# Patient Record
Sex: MALE | Race: WHITE | NOT HISPANIC OR LATINO | Employment: FULL TIME | URBAN - METROPOLITAN AREA
[De-identification: names, ages, dates, MRNs, and addresses within clinical notes are randomized per-mention and may not be internally consistent; named-entity substitution may affect disease eponyms.]

---

## 2022-08-31 ENCOUNTER — OFFICE VISIT (OUTPATIENT)
Dept: FAMILY MEDICINE CLINIC | Facility: CLINIC | Age: 32
End: 2022-08-31
Payer: COMMERCIAL

## 2022-08-31 VITALS
TEMPERATURE: 98.4 F | HEIGHT: 68 IN | SYSTOLIC BLOOD PRESSURE: 120 MMHG | BODY MASS INDEX: 30.92 KG/M2 | HEART RATE: 88 BPM | RESPIRATION RATE: 16 BRPM | DIASTOLIC BLOOD PRESSURE: 80 MMHG | WEIGHT: 204 LBS

## 2022-08-31 DIAGNOSIS — Z13.6 SCREENING FOR CARDIOVASCULAR, RESPIRATORY, AND GENITOURINARY DISEASES: ICD-10-CM

## 2022-08-31 DIAGNOSIS — Z00.00 WELL ADULT EXAM: Primary | ICD-10-CM

## 2022-08-31 DIAGNOSIS — Z13.29 SCREENING FOR THYROID DISORDER: ICD-10-CM

## 2022-08-31 DIAGNOSIS — Z13.83 SCREENING FOR CARDIOVASCULAR, RESPIRATORY, AND GENITOURINARY DISEASES: ICD-10-CM

## 2022-08-31 DIAGNOSIS — Z11.59 NEED FOR HEPATITIS C SCREENING TEST: ICD-10-CM

## 2022-08-31 DIAGNOSIS — Z13.89 SCREENING FOR CARDIOVASCULAR, RESPIRATORY, AND GENITOURINARY DISEASES: ICD-10-CM

## 2022-08-31 DIAGNOSIS — M25.50 POLYARTHRALGIA: ICD-10-CM

## 2022-08-31 DIAGNOSIS — Z11.4 SCREENING FOR HIV (HUMAN IMMUNODEFICIENCY VIRUS): ICD-10-CM

## 2022-08-31 PROCEDURE — 3725F SCREEN DEPRESSION PERFORMED: CPT | Performed by: FAMILY MEDICINE

## 2022-08-31 PROCEDURE — 99385 PREV VISIT NEW AGE 18-39: CPT | Performed by: FAMILY MEDICINE

## 2022-08-31 NOTE — PROGRESS NOTES
[facility] HEALTH MAINTENANCE OFFICE VISIT  [facility]    NAME: Rody Garay  AGE: 28 y o  SEX: male  : 1990     DATE: 2022    Assessment and Plan     1  Well adult exam    2  Need for hepatitis C screening test  -     Hepatitis C Antibody ([facility]); Future    3  Screening for HIV (human immunodeficiency virus)  -     [facility]- Human Immunodeficiency Virus 1/2 Antigen / Antibody ( Fourth Generation) with Reflex Testing; Future    4  Screening for cardiovascular, respiratory, and genitourinary diseases  -     CBC and differential; Future  -     Comprehensive metabolic panel; Future  -     Lipid Panel with Direct LDL reflex; Future    5  Screening for thyroid disorder  -     TSH, 3rd generation with Free T4 reflex; Future    6  Polyarthralgia  -     NOEMY Screen w/ Reflex to Titer/Pattern; Future  -     Anti-DNA antibody, double-stranded; Future  -     RF Screen w/ Reflex to Titer; Future  -     Sedimentation rate, automated; Future  -     C-reactive protein; Future  -     Lyme Antibody Profile with reflex to WB; Future  -     Sjogren's Antibodies; Future  -     Anti-DNA antibody, double-stranded  -     Sedimentation rate, automated  -     C-reactive protein  -     Lyme Antibody Profile with reflex to WB  -     Sjogren's Antibodies      Patient Counseling:   Nutrition: Stressed importance of a well balanced diet, moderation of sodium/saturated fat, caloric balance and sufficient intake of fiber  Exercise: Stressed the importance of regular exercise with a goal of 150 minutes per week  Dental Health: Discussed daily flossing and brushing and regular dental visits   Immunizations reviewed: Up To Date  Discussed benefits of:  Screening labs   BMI Counseling: Body mass index is 30 79 kg/m²  Discussed with patient's BMI with him   The BMI is above normal  Nutrition recommendations include reducing portion sizes, decreasing overall calorie intake, 3-5 servings of fruits/vegetables daily, reducing fast food intake and consuming healthier snacks  Exercise recommendations include moderate aerobic physical activity for 150 minutes/week  No follow-ups on file  Chief Complaint     Chief Complaint   Patient presents with    Physical Exam     Lester BOWLES       History of Present Illness     HPI    Well Adult Physical   Patient here for a comprehensive physical exam   He reports recent onset of arthralgias in his hands bilaterally  They feel stiff, worse in the morning, gets better through the day  Sometimes the feet and knees are affected as well  He also is experiencing muscle pains and feels like he is losing muscle mass  Has loss of appetite  Sugary drinks make him tired  Libido is low  He is also having cervical lymphadenopathy for the past few months  Has lost 10 lbs in the past 2 months  Diet and Physical Activity  Diet: well balanced diet  Exercise: frequently      Depression Screen  PHQ-2/9 Depression Screening    Little interest or pleasure in doing things: 0 - not at all  Feeling down, depressed, or hopeless: 0 - not at all  PHQ-2 Score: 0  PHQ-2 Interpretation: Negative depression screen          General Health  Hearing: Normal:  bilateral  Vision: no vision problems, goes for regular eye exams and wears glasses  Dental: regular dental visits, brushes teeth twice daily and flosses teeth occasionally    Reproductive Health  No issues       The following portions of the patient's history were reviewed and updated as appropriate: allergies, current medications, past family history, past medical history, past social history, past surgical history and problem list     Review of Systems     Review of Systems   Constitutional: Positive for unexpected weight change  HENT: Negative  Eyes: Negative  Respiratory: Negative  Cardiovascular: Negative  Gastrointestinal: Negative  Endocrine: Negative      Genitourinary: Negative  Musculoskeletal: Positive for arthralgias and myalgias  Neurological: Negative  Hematological: Negative  Psychiatric/Behavioral: Negative  Past Medical History     History reviewed  No pertinent past medical history  Past Surgical History     History reviewed  No pertinent surgical history  Social History     Social History     Socioeconomic History    Marital status: /Civil Union     Spouse name: None    Number of children: None    Years of education: None    Highest education level: None   Occupational History    None   Tobacco Use    Smoking status: Never Smoker    Smokeless tobacco: Never Used   Substance and Sexual Activity    Alcohol use: Yes     Comment: social use only    Drug use: Never    Sexual activity: Yes   Other Topics Concern    None   Social History Narrative    None     Social Determinants of Health     Financial Resource Strain: Not on file   Food Insecurity: Not on file   Transportation Needs: Not on file   Physical Activity: Not on file   Stress: Not on file   Social Connections: Not on file   Intimate Partner Violence: Not on file   Housing Stability: Not on file       Family History     History reviewed  No pertinent family history  Current Medications     No current outpatient medications on file  Allergies     Allergies   Allergen Reactions    Cat Hair Extract Sneezing    Other Sneezing     Environmental  allergies       Objective     /80   Pulse 88   Temp 98 4 °F (36 9 °C)   Resp 16   Ht 5' 8 25" (1 734 m)   Wt 92 5 kg (204 lb)   BMI 30 79 kg/m²      Physical Exam  Constitutional:       General: He is not in acute distress  Appearance: He is well-developed  He is not diaphoretic  HENT:      Head: Normocephalic and atraumatic        Right Ear: Hearing, tympanic membrane, ear canal and external ear normal       Left Ear: Hearing, tympanic membrane, ear canal and external ear normal       Nose: Nose normal  Mouth/Throat:      Pharynx: No oropharyngeal exudate  Eyes:      General: No scleral icterus  Right eye: No discharge  Left eye: No discharge  Conjunctiva/sclera: Conjunctivae normal       Pupils: Pupils are equal, round, and reactive to light  Neck:      Thyroid: No thyromegaly  Trachea: No tracheal deviation  Cardiovascular:      Rate and Rhythm: Normal rate and regular rhythm  Heart sounds: Normal heart sounds  No murmur heard  No friction rub  No gallop  Pulmonary:      Effort: Pulmonary effort is normal  No respiratory distress  Breath sounds: Normal breath sounds  No wheezing or rales  Chest:      Chest wall: No tenderness  Abdominal:      General: Bowel sounds are normal  There is no distension  Palpations: Abdomen is soft  There is no mass  Tenderness: There is no abdominal tenderness  There is no guarding or rebound  Musculoskeletal:         General: No tenderness or deformity  Normal range of motion  Cervical back: Normal range of motion and neck supple  Skin:     General: Skin is warm and dry  Coloration: Skin is not pale  Findings: No erythema or rash  Neurological:      Mental Status: He is alert and oriented to person, place, and time  Motor: No abnormal muscle tone  Coordination: Coordination normal       Deep Tendon Reflexes: Reflexes normal    Psychiatric:         Behavior: Behavior normal          Thought Content:  Thought content normal          Judgment: Judgment normal             Visual Acuity Screening    Right eye Left eye Both eyes   Without correction:      With correction: 20/20 20/20 20/20           MD ALBER Prado [facility]

## 2022-09-06 DIAGNOSIS — M25.50 POLYARTHRALGIA: Primary | ICD-10-CM

## 2022-09-06 DIAGNOSIS — R76.8 DS DNA ANTIBODY POSITIVE: ICD-10-CM

## 2022-09-06 LAB
B BURGDOR IGG+IGM SER QL IA: NEGATIVE
CRP SERPL-MCNC: 14 MG/L (ref 0–10)
DSDNA AB SER-ACNC: 84 IU/ML (ref 0–9)
ENA SS-A AB SER-ACNC: 0.2 AI (ref 0–0.9)
ENA SS-B AB SER-ACNC: <0.2 AI (ref 0–0.9)
ERYTHROCYTE [SEDIMENTATION RATE] IN BLOOD BY WESTERGREN METHOD: 78 MM/HR (ref 0–15)

## 2022-09-06 NOTE — PROGRESS NOTES
Called pt and left message on machine regarding blood work results  I also sent him a [employer]  He is aware to call back if he has any questions or concerns  His inflammation markers and lupus testing is positive  I would like for him to see a rheumatologist  Referral placed to see Dr Vidla Felix in his chart  I am still waiting on additional blood work

## 2022-09-07 DIAGNOSIS — E78.1 HYPERTRIGLYCERIDEMIA: Primary | ICD-10-CM

## 2022-09-07 LAB
ALBUMIN SERPL-MCNC: 3.7 G/DL (ref 4–5)
ALBUMIN/GLOB SERPL: 0.8 {RATIO} (ref 1.2–2.2)
ALP SERPL-CCNC: 72 IU/L (ref 44–121)
ALT SERPL-CCNC: 45 IU/L (ref 0–44)
ANA SPECKLED TITR SER: ABNORMAL {TITER}
ANA TITR SER IF: POSITIVE {TITER}
AST SERPL-CCNC: 29 IU/L (ref 0–40)
BASOPHILS # BLD AUTO: 0 X10E3/UL (ref 0–0.2)
BASOPHILS NFR BLD AUTO: 1 %
BILIRUB SERPL-MCNC: 0.4 MG/DL (ref 0–1.2)
BUN SERPL-MCNC: 15 MG/DL (ref 6–20)
BUN/CREAT SERPL: 16 (ref 9–20)
CALCIUM SERPL-MCNC: 9 MG/DL (ref 8.7–10.2)
CCP IGA+IGG SERPL IA-ACNC: 9 UNITS (ref 0–19)
CHLORIDE SERPL-SCNC: 98 MMOL/L (ref 96–106)
CHOLEST SERPL-MCNC: 134 MG/DL (ref 100–199)
CO2 SERPL-SCNC: 22 MMOL/L (ref 20–29)
CREAT SERPL-MCNC: 0.94 MG/DL (ref 0.76–1.27)
EGFR: 110 ML/MIN/1.73
EOSINOPHIL # BLD AUTO: 0.2 X10E3/UL (ref 0–0.4)
EOSINOPHIL NFR BLD AUTO: 3 %
ERYTHROCYTE [DISTWIDTH] IN BLOOD BY AUTOMATED COUNT: 15 % (ref 11.6–15.4)
GLOBULIN SER-MCNC: 4.6 G/DL (ref 1.5–4.5)
GLUCOSE SERPL-MCNC: 90 MG/DL (ref 65–99)
HCT VFR BLD AUTO: 35.3 % (ref 37.5–51)
HCV AB S/CO SERPL IA: <0.1 S/CO RATIO (ref 0–0.9)
HDLC SERPL-MCNC: 23 MG/DL
HGB BLD-MCNC: 12 G/DL (ref 13–17.7)
HIV 1+2 AB+HIV1 P24 AG SERPL QL IA: NON REACTIVE
IMM GRANULOCYTES # BLD: 0 X10E3/UL (ref 0–0.1)
IMM GRANULOCYTES NFR BLD: 1 %
LDLC SERPL CALC-MCNC: 52 MG/DL (ref 0–99)
LYMPHOCYTES # BLD AUTO: 1.1 X10E3/UL (ref 0.7–3.1)
LYMPHOCYTES NFR BLD AUTO: 19 %
MCH RBC QN AUTO: 26.8 PG (ref 26.6–33)
MCHC RBC AUTO-ENTMCNC: 34 G/DL (ref 31.5–35.7)
MCV RBC AUTO: 79 FL (ref 79–97)
MICRODELETION SYND BLD/T FISH: NORMAL
MONOCYTES # BLD AUTO: 0.3 X10E3/UL (ref 0.1–0.9)
MONOCYTES NFR BLD AUTO: 5 %
MORPHOLOGY BLD-IMP: ABNORMAL
NEUTROPHILS # BLD AUTO: 4.1 X10E3/UL (ref 1.4–7)
NEUTROPHILS NFR BLD AUTO: 71 %
PLATELET # BLD AUTO: 206 X10E3/UL (ref 150–450)
POTASSIUM SERPL-SCNC: 4.2 MMOL/L (ref 3.5–5.2)
PROT SERPL-MCNC: 8.3 G/DL (ref 6–8.5)
RBC # BLD AUTO: 4.47 X10E6/UL (ref 4.14–5.8)
RHEUMATOID FACT SERPL-ACNC: <10 IU/ML
SL AMB NOTE:: ABNORMAL
SODIUM SERPL-SCNC: 134 MMOL/L (ref 134–144)
TRIGL SERPL-MCNC: 390 MG/DL (ref 0–149)
TSH SERPL DL<=0.005 MIU/L-ACNC: 1.4 UIU/ML (ref 0.45–4.5)
WBC # BLD AUTO: 5.7 X10E3/UL (ref 3.4–10.8)

## 2022-09-08 DIAGNOSIS — M25.50 POLYARTHRALGIA: Primary | ICD-10-CM

## 2022-09-08 RX ORDER — NAPROXEN 500 MG/1
500 TABLET ORAL 2 TIMES DAILY PRN
Qty: 60 TABLET | Refills: 1 | Status: SHIPPED | OUTPATIENT
Start: 2022-09-08

## 2023-01-14 NOTE — PROGRESS NOTES
Assessment and Plan:   Mr Javi Carlson is a 27-year-old male originally from [place] with no significant past medical history who presents for an evaluation of a positive NOEMY >1:1280 speckled pattern and elevated double-stranded DNA antibody of 84 that were detected as a result of bilateral hand pain and swelling  He is referred by Dr Jessica Hinkle for a rheumatology consult  # Systemic lupus erythematosus (diagnosed based on positive NOEMY > 1:1280 speckled pattern, elevated dsDNA of 84, constitutional symptoms, inflammatory arthritis, lymphadenopathy and Raynaud's)  - Oz Bermudezws presents today for an evaluation of multiple complaints he has been experiencing since July 2022 as described below which in the context of the positive NOEMY and double-stranded DNA antibody appear consistent with a diagnosis of systemic lupus erythematosus  I discussed this with him in depth today and recommend initiating hydroxychloroquine 200 mg twice daily in order to improve his symptoms and prevent future complications of lupus from arising  I reviewed the side effects with him including but not limited to the need for a baseline and annual eye exam   As he is fairly stable at this time I will hold off on prescribing steroids and assess his response to the hydroxychloroquine by the follow-up visit  If he has development of new symptoms in the interim I requested he make me aware  I will update his lupus related lab monitoring and obtain additional serologies  - He is also describing B symptoms which is concerning but in my opinion with a new diagnosis of systemic lupus I suspect that these features are all related  Depending on the results of the blood work I will discuss with him if it may be beneficial to proceed with a hematology/oncology referral or consider imaging studies of his neck/chest/abdomen to further evaluate the lymphadenopathy and look for additional areas of lymphadenopathy        I have spent 45 minutes with Patient today in which greater than 50% of this time was spent in counseling/coordination of care regarding Diagnostic results, Prognosis, Risks and benefits of tx options, Intructions for management, Patient and family education and Impressions  Plan:  Diagnoses and all orders for this visit:    Systemic lupus erythematosus, unspecified SLE type, unspecified organ involvement status (HCC)  -     hydroxychloroquine (PLAQUENIL) 200 mg tablet; Take 1 tablet (200 mg total) by mouth 2 (two) times a day with meals  -     CBC and differential; Future  -     Comprehensive metabolic panel; Future  -     C-reactive protein; Future  -     Sedimentation rate, automated; Future  -     C4 complement; Future  -     C3 complement; Future  -     Anti-DNA antibody, double-stranded; Future  -     Urinalysis with microscopic  -     Protein / creatinine ratio, urine  -     Anti-Faye 1 Antibody; Future  -     Nuclear antigen antibody; Future  -     Anti-scleroderma antibody; Future  -     Centromere Antibody; Future  -     Histone Antibody; Future  -     Beta-2 glycoprotein antibodies; Future  -     Cardiolipin antibody; Future  -     Lupus anticoagulant; Future  -     CK; Future  -     Ferritin; Future  -     LD,Blood; Future  -     Protein electrophoresis, serum; Future  -     Leukemia/Lymphoma flow cytometry;  Future  -     CBC and differential  -     Comprehensive metabolic panel  -     C-reactive protein  -     Sedimentation rate, automated  -     C4 complement  -     C3 complement  -     Anti-DNA antibody, double-stranded  -     Anti-Faye 1 Antibody  -     Nuclear antigen antibody  -     Anti-scleroderma antibody  -     Centromere Antibody  -     Histone Antibody  -     Beta-2 glycoprotein antibodies  -     Cardiolipin antibody  -     Lupus anticoagulant  -     CK  -     Ferritin  -     LD,Blood  -     Protein electrophoresis, serum  -     Leukemia/Lymphoma flow cytometry    Inflammatory arthritis  -     Ambulatory Referral to Rheumatology    Long-term use of Plaquenil    Vitamin D deficiency  -     Vitamin D 25 hydroxy; Future  -     Vitamin D 25 hydroxy      Activities as tolerated  Continue other medications as prescribed by PCP and other specialists  RTC in 3 months  HPI  Mr Rafal Kaplan is a 43-year-old male originally from [place] with no significant past medical history who presents for an evaluation of a positive NOEMY >1:1280 speckled pattern and elevated double-stranded DNA antibody of 84 that were detected as a result of bilateral hand pain and swelling  He is referred by Dr Slick Delgado for a rheumatology consult  Patient reports he was in his usual state of health up until the summer 2022 and reports for at least 15 years prior to this he had not had to see a physician  At that time he developed abrupt onset of bilateral hand pain, swelling and morning stiffness where he was unable to make a fist   He reports running his hands under hot water in the morning would help  The prominent symptoms lasted through August and he reports following this the hand complaints spontaneously resolved and is currently 90% improved  At times he has noticed pain also occurring in his wrists and very uncommonly in his knees but generally he is not experiencing concerning joint pain  No involvement of his elbows, shoulders, hips, ankles or feet  He reports gradually since last year he has been experiencing diffuse muscle pain affecting the entirety of his upper and lower extremities  He reports with prolonged duration of being seated the symptoms worsen  This has been a fairly constant symptom  He did try naproxen prescribed by his primary care physician which was ineffective  He has not taken any over-the-counter pain medications      He has been experiencing multiple additional complaints since last July also including fatigue which is worse towards the end of the day, night sweats which has been severe on at least 3-4 occasions where he has woken up drenched, swollen lymph nodes which he has appreciated in his neck on both the anterior and posterior aspects [which he thinks becomes more prominent when he has episodes of the night sweats], mild hair loss which was occurring if he ran his fingers through his hair and has overall improved as well as symptoms consistent with Raynaud's affecting his hands where on cold exposure it will initially turn white and with rewarming change to purple/blue and then red  He reports from the summer till December 2022 he lost 15 pounds unintentionally but his current weight is stable at 197 pounds  He denies fevers, dry eyes, dry mouth, inflammatory eye disease, recurrent skin rash [reports over the past few dwyer he has appreciated a pimple type of rash on the inner aspects of his bilateral thighs which resolves spontaneously], psoriasis, photosensitivity, mouth/nose ulcers, pleuritic chest pain, shortness of breath, cough, inflammatory bowel disease [reports a history of treated H  pylori infection 10 years ago], blood clots or a family history of autoimmune disease [although he is unaware of his paternal family history]  In view of these symptoms he was evaluated by his primary care physician in September 2022 and had blood work done which showed the positive NOEMY and double-stranded DNA antibody  An ESR and CRP were elevated at 78 and 14, respectively  A CBC, CMP, Sjogren's antibodies, Lyme antibody profile, HIV, rheumatoid factor, anti-CCP antibody, hepatitis C antibody and TSH were unremarkable  The following portions of the patient's history were reviewed and updated as appropriate: allergies, current medications, past family history, past medical history, past social history, past surgical history and problem list       Review of Systems  Constitutional: Negative for fevers, chills  Positive for weight loss, fatigue and night sweats    ENT/Mouth: Negative for hearing changes, ear pain, nasal congestion, sinus pain, hoarseness, sore throat, rhinorrhea, swallowing difficulty  Eyes: Negative for pain, redness, discharge, vision changes  Cardiovascular: Negative for chest pain, SOB, palpitations  Respiratory: Negative for cough, sputum, wheezing, dyspnea  Gastrointestinal: Negative for nausea, vomiting, diarrhea, constipation, pain, heartburn  Genitourinary: Negative for dysuria, urinary frequency, hematuria  Musculoskeletal: As per HPI  Skin: Negative for skin rash  Positive for color changes  Neuro: Negative for weakness, numbness, tingling, loss of consciousness  Psych: Negative for anxiety, depression  Heme/Lymph: Negative for easy bruising, bleeding  Positive for lymphadenopathy  History reviewed  No pertinent past medical history  History reviewed  No pertinent surgical history  Social History     Socioeconomic History   • Marital status: /Civil Union     Spouse name: Not on file   • Number of children: Not on file   • Years of education: Not on file   • Highest education level: Not on file   Occupational History   • Not on file   Tobacco Use   • Smoking status: Never   • Smokeless tobacco: Never   Substance and Sexual Activity   • Alcohol use: Yes     Comment: social use only   • Drug use: Never   • Sexual activity: Yes   Other Topics Concern   • Not on file   Social History Narrative   • Not on file     Social Determinants of Health     Financial Resource Strain: Not on file   Food Insecurity: Not on file   Transportation Needs: Not on file   Physical Activity: Not on file   Stress: Not on file   Social Connections: Not on file   Intimate Partner Violence: Not on file   Housing Stability: Not on file       No family history on file        Allergies   Allergen Reactions   • Cat Hair Extract Sneezing   • Other Sneezing     Environmental  allergies       Current Outpatient Medications:   •  hydroxychloroquine (PLAQUENIL) 200 mg tablet, Take 1 tablet (200 mg total) by mouth 2 (two) times a day with meals, Disp: 180 tablet, Rfl: 1  •  naproxen (Naprosyn) 500 mg tablet, Take 1 tablet (500 mg total) by mouth 2 (two) times a day as needed for mild pain (take with food), Disp: 60 tablet, Rfl: 1      Objective:    Vitals:    01/16/23 1347   BP: 145/84   Pulse: (!) 112   Temp: 98 4 °F (36 9 °C)   Weight: 89 4 kg (197 lb)       Physical Exam  General: Well appearing, well nourished, in no distress  Oriented x 3, normal mood and affect  Ambulating without difficulty  Skin: Good turgor, no rash, unusual bruising or prominent lesions  Hair: Normal texture and distribution  Nails: Normal color, no deformities  HEENT:  Head: Normocephalic, atraumatic  Eyes: Conjunctiva clear, sclera non-icteric, EOM intact  Nose: No external lesions, mucosa non-inflamed  Mouth: Mucous membranes moist, no mucosal lesions  Neck: Supple, thyroid non-enlarged and non-tender  There are multiple enlarged lymph nodes appreciated in the upper cervical chain which are soft, mobile, nontender and less than 1 cm in size  There is also occipital lymphadenopathy noted bilaterally but more prominent on the left side where there is an approximately 2 cm enlarged node which is firm, nontender and nonmobile  He reports that this has been present for years  Extremities: No amputations or deformities, cyanosis, edema  Musculoskeletal:   Hands - there is soft tissue swelling without tenderness at the right hand third PIP joint  Otherwise his bilateral PIP and MCP joints are unremarkable  The remaining peripheral joints are unremarkable  Neurologic: Alert and oriented  No focal neurological deficits appreciated  Psychiatric: Normal mood and affect  Lymph nodes: No axillary lymphadenopathy appreciated  AIME Bullock    Rheumatology

## 2023-01-16 ENCOUNTER — OFFICE VISIT (OUTPATIENT)
Dept: RHEUMATOLOGY | Facility: CLINIC | Age: 33
End: 2023-01-16

## 2023-01-16 VITALS
SYSTOLIC BLOOD PRESSURE: 145 MMHG | DIASTOLIC BLOOD PRESSURE: 84 MMHG | TEMPERATURE: 98.4 F | BODY MASS INDEX: 29.73 KG/M2 | WEIGHT: 197 LBS | HEART RATE: 112 BPM

## 2023-01-16 DIAGNOSIS — Z79.899 LONG-TERM USE OF PLAQUENIL: ICD-10-CM

## 2023-01-16 DIAGNOSIS — M19.90 INFLAMMATORY ARTHRITIS: ICD-10-CM

## 2023-01-16 DIAGNOSIS — M32.9 SYSTEMIC LUPUS ERYTHEMATOSUS, UNSPECIFIED SLE TYPE, UNSPECIFIED ORGAN INVOLVEMENT STATUS (HCC): Primary | ICD-10-CM

## 2023-01-16 DIAGNOSIS — E55.9 VITAMIN D DEFICIENCY: ICD-10-CM

## 2023-01-16 RX ORDER — HYDROXYCHLOROQUINE SULFATE 200 MG/1
200 TABLET, FILM COATED ORAL 2 TIMES DAILY WITH MEALS
Qty: 180 TABLET | Refills: 1 | Status: SHIPPED | OUTPATIENT
Start: 2023-01-16 | End: 2023-07-15

## 2023-01-24 LAB
25(OH)D3+25(OH)D2 SERPL-MCNC: 14.2 NG/ML (ref 30–100)
ALBUMIN SERPL ELPH-MCNC: 3.3 G/DL (ref 2.9–4.4)
ALBUMIN SERPL-MCNC: 4.1 G/DL (ref 4–5)
ALBUMIN/GLOB SERPL: 0.6 {RATIO} (ref 0.7–1.7)
ALBUMIN/GLOB SERPL: 0.9 {RATIO} (ref 1.2–2.2)
ALP SERPL-CCNC: 75 IU/L (ref 44–121)
ALPHA1 GLOB SERPL ELPH-MCNC: 0.3 G/DL (ref 0–0.4)
ALPHA2 GLOB SERPL ELPH-MCNC: 0.9 G/DL (ref 0.4–1)
ALT SERPL-CCNC: 25 IU/L (ref 0–44)
ANNOTATION COMMENT IMP: NORMAL
APPEARANCE UR: CLEAR
APTT SCREEN TO CONFIRM RATIO: 0.99 RATIO (ref 0–1.34)
AST SERPL-CCNC: 30 IU/L (ref 0–40)
B-GLOBULIN SERPL ELPH-MCNC: 0.8 G/DL (ref 0.7–1.3)
B2 GLYCOPROT1 IGA SER-ACNC: 15 GPI IGA UNITS (ref 0–25)
B2 GLYCOPROT1 IGG SER-ACNC: <9 GPI IGG UNITS (ref 0–20)
B2 GLYCOPROT1 IGM SER-ACNC: <9 GPI IGM UNITS (ref 0–32)
BACTERIA URNS QL MICRO: NORMAL
BASOPHILS # BLD AUTO: 0 X10E3/UL (ref 0–0.2)
BASOPHILS NFR BLD AUTO: 1 %
BILIRUB SERPL-MCNC: 0.4 MG/DL (ref 0–1.2)
BILIRUB UR QL STRIP: NEGATIVE
BUN SERPL-MCNC: 12 MG/DL (ref 6–20)
BUN/CREAT SERPL: 14 (ref 9–20)
C3 SERPL-MCNC: 77 MG/DL (ref 82–167)
C4 SERPL-MCNC: 2 MG/DL (ref 12–38)
CALCIUM SERPL-MCNC: 9.1 MG/DL (ref 8.7–10.2)
CARDIOLIPIN IGA SER IA-ACNC: <9 APL U/ML (ref 0–11)
CARDIOLIPIN IGG SER IA-ACNC: 71 GPL U/ML (ref 0–14)
CARDIOLIPIN IGM SER IA-ACNC: 38 MPL U/ML (ref 0–12)
CASTS URNS QL MICRO: NORMAL /LPF
CENTROMERE B AB SER-ACNC: <0.2 AI (ref 0–0.9)
CHLORIDE SERPL-SCNC: 99 MMOL/L (ref 96–106)
CK SERPL-CCNC: 32 U/L (ref 49–439)
CLINICAL INFO: NORMAL
CO2 SERPL-SCNC: 23 MMOL/L (ref 20–29)
COLOR UR: YELLOW
CONFIRM APTT/NORMAL: 31.2 SEC (ref 0–47.6)
CREAT SERPL-MCNC: 0.87 MG/DL (ref 0.76–1.27)
CREAT UR-MCNC: 75.8 MG/DL
CRP SERPL-MCNC: 10 MG/L (ref 0–10)
DSDNA AB SER-ACNC: 45 IU/ML (ref 0–9)
EGFR: 117 ML/MIN/1.73
ENA JO1 AB SER-ACNC: <0.2 AI (ref 0–0.9)
ENA RNP AB SER-ACNC: >8 AI (ref 0–0.9)
ENA SCL70 AB SER-ACNC: 0.2 AI (ref 0–0.9)
ENA SM AB SER-ACNC: >8 AI (ref 0–0.9)
EOSINOPHIL # BLD AUTO: 0.1 X10E3/UL (ref 0–0.4)
EOSINOPHIL NFR BLD AUTO: 2 %
EPI CELLS #/AREA URNS HPF: NORMAL /HPF (ref 0–10)
ERYTHROCYTE [DISTWIDTH] IN BLOOD BY AUTOMATED COUNT: 14.8 % (ref 11.6–15.4)
ERYTHROCYTE [SEDIMENTATION RATE] IN BLOOD BY WESTERGREN METHOD: 110 MM/HR (ref 0–15)
FERRITIN SERPL-MCNC: 236 NG/ML (ref 30–400)
GAMMA GLOB SERPL ELPH-MCNC: 3.6 G/DL (ref 0.4–1.8)
GLOBULIN SER CALC-MCNC: 5.6 G/DL (ref 2.2–3.9)
GLOBULIN SER-MCNC: 4.8 G/DL (ref 1.5–4.5)
GLUCOSE SERPL-MCNC: 87 MG/DL (ref 70–99)
GLUCOSE UR QL: NEGATIVE
HCT VFR BLD AUTO: 37.6 % (ref 37.5–51)
HGB BLD-MCNC: 12.1 G/DL (ref 13–17.7)
HGB UR QL STRIP: NEGATIVE
HISTONE IGG SER IA-ACNC: 6.9 UNITS (ref 0–0.9)
IMM GRANULOCYTES # BLD: 0 X10E3/UL (ref 0–0.1)
IMM GRANULOCYTES NFR BLD: 1 %
IMMUNOPHENOTYPING STUDY: NORMAL
KETONES UR QL STRIP: NEGATIVE
LA PPP-IMP: NORMAL
LABORATORY COMMENT REPORT: ABNORMAL
LABORATORY COMMENT REPORT: NORMAL
LDH SERPL-CCNC: 269 IU/L (ref 121–224)
LEUKOCYTE ESTERASE UR QL STRIP: NEGATIVE
LYMPHOCYTES # BLD AUTO: 1.1 X10E3/UL (ref 0.7–3.1)
LYMPHOCYTES NFR BLD AUTO: 21 %
M PROTEIN SERPL ELPH-MCNC: ABNORMAL G/DL
MCH RBC QN AUTO: 25.4 PG (ref 26.6–33)
MCHC RBC AUTO-ENTMCNC: 32.2 G/DL (ref 31.5–35.7)
MCV RBC AUTO: 79 FL (ref 79–97)
MICRO URNS: NORMAL
MICRO URNS: NORMAL
MONOCYTES # BLD AUTO: 0.2 X10E3/UL (ref 0.1–0.9)
MONOCYTES NFR BLD AUTO: 4 %
MORPHOLOGY BLD-IMP: ABNORMAL
NEUTROPHILS # BLD AUTO: 3.8 X10E3/UL (ref 1.4–7)
NEUTROPHILS NFR BLD AUTO: 71 %
NITRITE UR QL STRIP: NEGATIVE
PATH INTERP SPEC-IMP: NORMAL
PATHOLOGIST NAME: NORMAL
PH UR STRIP: 6.5 [PH] (ref 5–7.5)
PLATELET # BLD AUTO: 210 X10E3/UL (ref 150–450)
POTASSIUM SERPL-SCNC: 4.1 MMOL/L (ref 3.5–5.2)
PROT SERPL-MCNC: 8.9 G/DL (ref 6–8.5)
PROT UR QL STRIP: NEGATIVE
PROT UR-MCNC: 15.7 MG/DL
PROT/CREAT UR: 207 MG/G CREAT (ref 0–200)
RBC # BLD AUTO: 4.77 X10E6/UL (ref 4.14–5.8)
RBC #/AREA URNS HPF: NORMAL /HPF (ref 0–2)
SCREEN APTT: 31.6 SEC (ref 0–51.9)
SCREEN DRVVT: 32.8 SEC (ref 0–47)
SL AMB ANALYSIS AND GATING STRATEGY: NORMAL
SL AMB ASSESSMENT OF LEUKOCYTES: NORMAL
SODIUM SERPL-SCNC: 135 MMOL/L (ref 134–144)
SP GR UR: 1.01 (ref 1–1.03)
SPECIMEN SOURCE: NORMAL
THROMBIN TIME: 16.4 SEC (ref 0–23)
UROBILINOGEN UR STRIP-ACNC: 0.2 MG/DL (ref 0.2–1)
VIABLE CELLS NFR SPEC: NORMAL %
WBC # BLD AUTO: 5.3 X10E3/UL (ref 3.4–10.8)
WBC #/AREA URNS HPF: NORMAL /HPF (ref 0–5)

## 2023-01-26 ENCOUNTER — TELEPHONE (OUTPATIENT)
Dept: RHEUMATOLOGY | Facility: CLINIC | Age: 33
End: 2023-01-26

## 2023-01-26 NOTE — TELEPHONE ENCOUNTER
Left detailed message for patient offering a virtual visit for tomorrow at 1 15 pm to go over lab results

## 2023-01-26 NOTE — TELEPHONE ENCOUNTER
----- Message from Dionicia Goldberg, MD sent at 1/24/2023  7:59 PM EST -----  Please schedule him for a virtual appointment on 1/26 to review his results  Can check the time with me, thanks

## 2023-01-27 ENCOUNTER — TELEMEDICINE (OUTPATIENT)
Dept: RHEUMATOLOGY | Facility: CLINIC | Age: 33
End: 2023-01-27

## 2023-01-27 DIAGNOSIS — R61 NIGHT SWEATS: ICD-10-CM

## 2023-01-27 DIAGNOSIS — Z79.899 LONG-TERM USE OF PLAQUENIL: ICD-10-CM

## 2023-01-27 DIAGNOSIS — R59.1 LYMPHADENOPATHY: ICD-10-CM

## 2023-01-27 DIAGNOSIS — M19.90 INFLAMMATORY ARTHRITIS: ICD-10-CM

## 2023-01-27 DIAGNOSIS — M32.9 SYSTEMIC LUPUS ERYTHEMATOSUS, UNSPECIFIED SLE TYPE, UNSPECIFIED ORGAN INVOLVEMENT STATUS (HCC): Primary | ICD-10-CM

## 2023-01-27 DIAGNOSIS — Z79.52 CURRENT CHRONIC USE OF SYSTEMIC STEROIDS: ICD-10-CM

## 2023-01-27 DIAGNOSIS — E55.9 VITAMIN D DEFICIENCY: ICD-10-CM

## 2023-01-27 RX ORDER — PREDNISONE 10 MG/1
TABLET ORAL
Qty: 70 TABLET | Refills: 0 | Status: SHIPPED | OUTPATIENT
Start: 2023-01-27

## 2023-01-27 RX ORDER — ERGOCALCIFEROL 1.25 MG/1
50000 CAPSULE ORAL WEEKLY
Qty: 12 CAPSULE | Refills: 0 | Status: SHIPPED | OUTPATIENT
Start: 2023-01-27

## 2023-01-27 NOTE — PROGRESS NOTES
Virtual Regular Visit    Verification of patient location:    Patient is located in the following state in which I hold an active license [place]      Assessment/Plan:    Problem List Items Addressed This Visit    None  Visit Diagnoses     Systemic lupus erythematosus, unspecified SLE type, unspecified organ involvement status ([facility] [address] )    -  Primary    Relevant Medications    predniSONE 10 mg tablet    Other Relevant Orders    Ambulatory Referral to Hematology / Oncology    CBC and differential    Comprehensive metabolic panel    C-reactive protein    Sedimentation rate, automated    C4 complement    C3 complement    Anti-DNA antibody, double-stranded    Urinalysis with microscopic    Protein / creatinine ratio, urine    Cardiolipin antibody    Inflammatory arthritis        Lymphadenopathy        Relevant Orders    Ambulatory Referral to Hematology / Oncology    Night sweats        Relevant Orders    Ambulatory Referral to Hematology / Oncology    Long-term use of Plaquenil        Current chronic use of systemic steroids        Vitamin D deficiency        Relevant Medications    ergocalciferol (VITAMIN D2) 50,000 units    Other Relevant Orders    Vitamin D 25 hydroxy               Reason for visit is follow up  Chief Complaint   Patient presents with   • Virtual Regular Visit        Encounter provider Kyra Galindo MD    Provider located at [address] [place] [id number] [phone]      Recent Visits  Date Type Provider Dept   01/26/23 Telephone René Gonzales [facility]uels recent visits within past 7 days and meeting all other requirements  Today's Visits  Date Type Provider Dept   01/27/23 Telemedicine Kyra Galindo MD [facility]   Showing today's visits and meeting all other requirements  Future Appointments  No visits were found meeting these conditions    Showing future appointments within next 150 days and meeting all other requirements       The patient was identified by name and date of birth  Lizy Roberts was informed that this is a telemedicine visit and that the visit is being conducted through Telephone  My office door was closed  No one else was in the room  He acknowledged consent and understanding of privacy and security of the video platform  The patient has agreed to participate and understands they can discontinue the visit at any time  Patient is aware this is a billable service  Subjective    HPI     INITIAL VISIT NOTE (1/2023):  Mr Harpreet Barros is a 54-year-old male originally from [place] with no significant past medical history who presents for an evaluation of a positive NOEMY >1:1280 speckled pattern and elevated double-stranded DNA antibody of 84 that were detected as a result of bilateral hand pain and swelling  He is referred by Dr Donte Kaiser for a rheumatology consult      Patient reports he was in his usual state of health up until the summer 2022 and reports for at least 15 years prior to this he had not had to see a physician  At that time he developed abrupt onset of bilateral hand pain, swelling and morning stiffness where he was unable to make a fist   He reports running his hands under hot water in the morning would help  The prominent symptoms lasted through August and he reports following this the hand complaints spontaneously resolved and is currently 90% improved  At times he has noticed pain also occurring in his wrists and very uncommonly in his knees but generally he is not experiencing concerning joint pain  No involvement of his elbows, shoulders, hips, ankles or feet  He reports gradually since last year he has been experiencing diffuse muscle pain affecting the entirety of his upper and lower extremities  He reports with prolonged duration of being seated the symptoms worsen  This has been a fairly constant symptom    He did try naproxen prescribed by his primary care physician which was ineffective  He has not taken any over-the-counter pain medications      He has been experiencing multiple additional complaints since last July also including fatigue which is worse towards the end of the day, night sweats which has been severe on at least 3-4 occasions where he has woken up drenched, swollen lymph nodes which he has appreciated in his neck on both the anterior and posterior aspects [which he thinks becomes more prominent when he has episodes of the night sweats], mild hair loss which was occurring if he ran his fingers through his hair and has overall improved as well as symptoms consistent with Raynaud's affecting his hands where on cold exposure it will initially turn white and with rewarming change to purple/blue and then red  He reports from the summer till December 2022 he lost 15 pounds unintentionally but his current weight is stable at 197 pounds      He denies fevers, dry eyes, dry mouth, inflammatory eye disease, recurrent skin rash [reports over the past few dwyer he has appreciated a pimple type of rash on the inner aspects of his bilateral thighs which resolves spontaneously], psoriasis, photosensitivity, mouth/nose ulcers, pleuritic chest pain, shortness of breath, cough, inflammatory bowel disease [reports a history of treated H  pylori infection 10 years ago], blood clots or a family history of autoimmune disease [although he is unaware of his paternal family history]     In view of these symptoms he was evaluated by his primary care physician in September 2022 and had blood work done which showed the positive NOEMY and double-stranded DNA antibody  An ESR and CRP were elevated at 78 and 14, respectively  A CBC, CMP, Sjogren's antibodies, Lyme antibody profile, HIV, rheumatoid factor, anti-CCP antibody, hepatitis C antibody and TSH were unremarkable        1/27/2023:  Patient presents for a follow-up of systemic lupus erythematosus  He is currently on hydroxychloroquine 200 mg twice daily that was started at the initial office visit  I reviewed his blood work done after the visit which shows an elevated ESR of 110 with hypocomplementemia with 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 and C4 decreased at 77 and 2, respectively  A double-stranded DNA antibody was elevated at 45  An anti-RNP and Rothman antibodies were greater than 8  An antihistone antibody was elevated at 6 9  An anticardiolipin IgG and IgM antibodies were elevated at 71 and 38, respectively  Vitamin D levels were low at 14 2  LDH was slightly elevated at 269  A CBC, CMP, C-reactive protein, urine analysis, urine protein creatinine ratio, remainder of the NOEMY specificity, beta-2 glycoprotein antibodies, lupus anticoagulant, CK, ferritin, SPEP and leukemia/lymphoma flow cytometry were unremarkable  He is not reporting any new complaints and we scheduled a follow-up appointment to review his results  No past medical history on file  No past surgical history on file  Current Outpatient Medications   Medication Sig Dispense Refill   • ergocalciferol (VITAMIN D2) 50,000 units Take 1 capsule (50,000 Units total) by mouth once a week 12 capsule 0   • predniSONE 10 mg tablet Take 4 tabs once daily x 1 week, then 3 tabs once daily x 1 week, then 2 tabs once daily x 1 week, then 1 tab once daily x 1 week and stop  70 tablet 0   • hydroxychloroquine (PLAQUENIL) 200 mg tablet Take 1 tablet (200 mg total) by mouth 2 (two) times a day with meals 180 tablet 1     No current facility-administered medications for this visit  Allergies   Allergen Reactions   • Cat Hair Extract Sneezing   • Other Sneezing     Environmental  allergies       Review of Systems  Constitutional: Negative for fevers, chills  Positive for weight loss, fatigue and night sweats    ENT/Mouth: Negative for hearing changes, ear pain, nasal congestion, sinus pain, hoarseness, sore throat, rhinorrhea, swallowing difficulty  Eyes: Negative for pain, redness, discharge, vision changes  Cardiovascular: Negative for chest pain, SOB, palpitations  Respiratory: Negative for cough, sputum, wheezing, dyspnea  Gastrointestinal: Negative for nausea, vomiting, diarrhea, constipation, pain, heartburn  Genitourinary: Negative for dysuria, urinary frequency, hematuria  Musculoskeletal: As per HPI  Skin: Negative for skin rash  Positive for color changes  Neuro: Negative for weakness, numbness, tingling, loss of consciousness  Psych: Negative for anxiety, depression  Heme/Lymph: Negative for easy bruising, bleeding  Positive for lymphadenopathy  Assessment and Plan:   Mr Bk Posada is a 79-year-old male originally from [place] with history significant for systemic lupus erythematosus diagnosed in January 2023 [based on a positive NOEMY>1:1280 speckled pattern, elevated double-stranded DNA antibody of 84, anti-RNP/Rothman antibody greater than 8, elevated antihistone antibody, hypocomplementemia, elevated anticardiolipin antibodies, constitutional symptoms, inflammatory arthritis, lymphadenopathy and Raynaud's] who presents for a follow-up  He is currently on hydroxychloroquine 200 mg twice daily that was started in January 2023      # Systemic lupus erythematosus  - [place]un[place] presents today for a follow-up of systemic lupus erythematosus for which he is currently on hydroxychloroquine 200 mg twice daily that was started at his initial office visit 2 weeks ago  We scheduled an appointment today to review his results  This shows further evidence suggestive of lupus including hypocomplementemia and additionally elevated antibodies including an RNP, Rothman, histone and cardiolipins  In addition it also shows systemic inflammation with a significantly elevated ESR  In view of this I recommend an initial prednisone course which I will start at 40 mg once daily and he will taper by 10 mg every week    The hydroxychloroquine will be continued unchanged  I requested he update his labs again in the next 2 months to monitor for improvement  - I would also recommend a referral to hematology/oncology given the symptoms he is experiencing of unintentional weight loss, night sweats and lymphadenopathy  I have low suspicion for an underlying malignant process and suspect his symptoms and abnormal test results are all suggestive of lupus, but would like this also to be clarified by hematology  # Vitamin D deficiency  - He will be started on vitamin D supplements 50,000 international units once weekly for 12 weeks and following completion of this he can continue a daily supplement over-the-counter  I spent 21 minutes directly with the patient during this visit

## 2023-02-13 ENCOUNTER — TELEPHONE (OUTPATIENT)
Dept: HEMATOLOGY ONCOLOGY | Facility: CLINIC | Age: 33
End: 2023-02-13

## 2023-02-15 ENCOUNTER — TELEPHONE (OUTPATIENT)
Dept: HEMATOLOGY ONCOLOGY | Facility: CLINIC | Age: 33
End: 2023-02-15

## 2023-03-07 ENCOUNTER — CONSULT (OUTPATIENT)
Dept: HEMATOLOGY ONCOLOGY | Facility: MEDICAL CENTER | Age: 33
End: 2023-03-07

## 2023-03-07 VITALS
DIASTOLIC BLOOD PRESSURE: 80 MMHG | WEIGHT: 201 LBS | HEART RATE: 85 BPM | TEMPERATURE: 98 F | BODY MASS INDEX: 30.46 KG/M2 | HEIGHT: 68 IN | SYSTOLIC BLOOD PRESSURE: 120 MMHG | OXYGEN SATURATION: 98 % | RESPIRATION RATE: 16 BRPM

## 2023-03-07 DIAGNOSIS — R59.1 LYMPHADENOPATHY: Primary | ICD-10-CM

## 2023-03-07 DIAGNOSIS — M32.9 SYSTEMIC LUPUS ERYTHEMATOSUS, UNSPECIFIED SLE TYPE, UNSPECIFIED ORGAN INVOLVEMENT STATUS (HCC): ICD-10-CM

## 2023-03-07 DIAGNOSIS — R77.9 ELEVATED BLOOD PROTEIN: ICD-10-CM

## 2023-03-07 DIAGNOSIS — R61 NIGHT SWEATS: ICD-10-CM

## 2023-03-08 ENCOUNTER — TELEPHONE (OUTPATIENT)
Dept: HEMATOLOGY ONCOLOGY | Facility: CLINIC | Age: 33
End: 2023-03-08

## 2023-03-08 ENCOUNTER — DOCUMENTATION (OUTPATIENT)
Dept: HEMATOLOGY ONCOLOGY | Facility: MEDICAL CENTER | Age: 33
End: 2023-03-08

## 2023-03-08 NOTE — PROGRESS NOTES
Left voicemail for patient that he has been scheduled for his follow up with Dr Vasquez Falling on Wed  3/29/23 at 2:40pm   I asked Willy to call back to confirm

## 2023-03-08 NOTE — TELEPHONE ENCOUNTER
Appointment Confirmation (to confirm pre existing appointments - ONLY)  No need to route   Who is calling to confirm? Patient   If someone other than patient is calling, are they listed on the communication consent form? N/A   Appointment with  Dr Darwin Dean   Appointment date & time  3/29/23 2:40PM   Appointment location [facility]   Patient verbilized understanding Yes     Patient is confirming that this appointment is okay with him  Patient received a voicemail asking if this date and time worked for him

## 2023-03-09 NOTE — PROGRESS NOTES
[facility] [address] [address]  Hematology Ambulatory Consult  [facility], 1990, [id number]  3/7/2023    Assessment and Plan   1  Lymphadenopathy; 2  Night sweats; 3  Elevated blood protein  This is a 28-year-old male with past medical history of the above  Interestingly with the application of steroid, the patient's symptoms have almost entirely resolved  Discussed additional work-up  However, I reviewed with the patient that treatments for lymphoma include steroids  With the patient being on high-dose steroids, I recommended that we evaluate for lymphadenopathy in the chest abdomen and pelvis  I have also requested additional diagnostics as outlined below  Patient will return to the office in approximately 4 weeks after CT scan and laboratory assessments are completed  At that time, additional recommendations can be made based upon imaging results  - Ambulatory Referral to Hematology / Oncology  - Comprehensive metabolic panel; Future  - Kappa/Lambda Light Chains Free With Ratio, Serum; Future  - Protein electrophoresis, urine  - IgG, IgA, IgM; Future  - Iron Panel (Includes Ferritin, Iron Sat%, Iron, and TIBC); Future  - Ferritin; Future  - CBC and differential; Future  - CT neck chest abdomen pelvis w contrast; Future  - Comprehensive metabolic panel  - IgG, IgA, IgM  - Ferritin  - CBC and differential    4  Systemic lupus erythematosus, unspecified SLE type, unspecified organ involvement status ([facility] [address] )  Patient has been feeling better since started on prednisone  Patient is also on Plaquenil regularly  Patient has follow-up with rheumatology in 2 months  - Ambulatory Referral to Hematology / Oncology      The patient is scheduled for follow-up in approximately 3 weeks with Dr Tiana Monahan  Patient voiced agreement and understanding to the above     Patient knows to call the Hematology/Oncology office with any questions and concerns regarding the above  Barrier(s) to care: None  The patient is able to self care  Nikki Aquino PA-C  Medical Oncology/Hematology  [address]    Subjective     Chief Complaint   Patient presents with   • Consult       Referring provider    MD Kevin Regan 5  [address]  Roberto Almodovar,  [address] [place] [address]    History of present illness: This is a 61-year-old male with past medical history of lupus diagnosed in January 2023 who presents to hematology secondary to night sweats and lymphadenopathy, at the request of rheumatology  Patient notes that he was in a normal state of health up until August/July 2022  Patient notes that he was having arthritic type pains in his hands with associated stiffness  Patient had some fevers however very mild  Patient went to PCP office in August 2022  Initial blood work demonstrated positive dsDNA, positive NOEMY with a greater than 1:1280 ratio with elevated sed rate and CRP  CBC demonstrated mild microcytic anemia  CMP included a low albumin with an elevated total globulin and a low albumin/globulin ratio  ALT was slightly elevated pertinent negatives included negative RF and negative surgeons antibody, Lyme's profile  Clinical concern with joint pains in the above symptoms was 4 SLE  Patient was referred to rheumatology  Rheumatology blood work included further rheumatologic evaluation as well as a normal ferritin level, SPEP without M spike, normal flow cytometry, LDH within normal limits  Patient was tested for antiphospholipid syndrome and was negative  Due to continued symptomatology, patient was then referred to hematology for further evaluation  However, when the patient started on steroids, patient's symptoms were reduced to eliminated, including lymphadenopathy, night sweats, joint pain the patient began to gain weight    Patient was also started on hydroxychloroquine when which she does not perceive is causing any major changes  03/09/23: Was very meticulous regarding the sensation of symptoms and documenting dietary changes and situational changes that may have affected his joints  Patient is doing well on medication, no obvious side effects  Prednisone has been titrated, and he is no longer on this  Review of Systems   Constitutional: Positive for diaphoresis (occasional)  Musculoskeletal: Positive for arthralgias  All other systems reviewed and are negative  No past medical history on file  No past surgical history on file  No family history on file  Social History     Socioeconomic History   • Marital status: /Civil Union     Spouse name: None   • Number of children: None   • Years of education: None   • Highest education level: None   Occupational History   • None   Tobacco Use   • Smoking status: Never   • Smokeless tobacco: Never   Substance and Sexual Activity   • Alcohol use: Yes     Comment: social use only   • Drug use: Never   • Sexual activity: Yes   Other Topics Concern   • None   Social History Narrative   • None     Social Determinants of Health     Financial Resource Strain: Not on file   Food Insecurity: Not on file   Transportation Needs: Not on file   Physical Activity: Not on file   Stress: Not on file   Social Connections: Not on file   Intimate Partner Violence: Not on file   Housing Stability: Not on file         Current Outpatient Medications:   •  ergocalciferol (VITAMIN D2) 50,000 units, Take 1 capsule (50,000 Units total) by mouth once a week, Disp: 12 capsule, Rfl: 0  •  hydroxychloroquine (PLAQUENIL) 200 mg tablet, Take 1 tablet (200 mg total) by mouth 2 (two) times a day with meals, Disp: 180 tablet, Rfl: 1  •  predniSONE 10 mg tablet, Take 4 tabs once daily x 1 week, then 3 tabs once daily x 1 week, then 2 tabs once daily x 1 week, then 1 tab once daily x 1 week and stop   (Patient not taking: Reported on 3/7/2023), Disp: 70 tablet, Rfl: 0  Allergies   Allergen Reactions   • Cat Hair Extract Sneezing   • Other Sneezing     Environmental  allergies       Objective   /80 (BP Location: Left arm, Patient Position: Sitting, Cuff Size: Adult)   Pulse 85   Temp 98 °F (36 7 °C)   Resp 16   Ht 5' 8" (1 727 m)   Wt 91 2 kg (201 lb)   SpO2 98%   BMI 30 56 kg/m²   Physical Exam  Constitutional:       General: He is not in acute distress  Appearance: He is well-developed  HENT:      Head: Normocephalic and atraumatic  Mouth/Throat:      Pharynx: No oropharyngeal exudate  Eyes:      General: No scleral icterus  Conjunctiva/sclera: Conjunctivae normal       Pupils: Pupils are equal, round, and reactive to light  Cardiovascular:      Rate and Rhythm: Normal rate and regular rhythm  Heart sounds: No murmur heard  Pulmonary:      Effort: Pulmonary effort is normal  No respiratory distress  Breath sounds: Normal breath sounds  Abdominal:      General: Bowel sounds are normal  There is no distension  Palpations: Abdomen is soft  There is splenomegaly (borderline- tip palpated)  Tenderness: There is no abdominal tenderness  Musculoskeletal:         General: No tenderness  Cervical back: Normal range of motion  Lymphadenopathy:      Head:      Right side of head: No submental, submandibular, tonsillar, preauricular, posterior auricular or occipital adenopathy  Left side of head: No submental, submandibular, tonsillar, preauricular, posterior auricular or occipital adenopathy  Cervical: Cervical adenopathy present  Right cervical: No superficial, deep or posterior cervical adenopathy  Left cervical: Posterior cervical adenopathy present  Upper Body:      Right upper body: No supraclavicular, axillary, pectoral or epitrochlear adenopathy  Left upper body: No supraclavicular, axillary, pectoral or epitrochlear adenopathy  Lower Body: No right inguinal adenopathy  No left inguinal adenopathy  Skin:     Coloration: Skin is not pale  Findings: No erythema or rash  Neurological:      Mental Status: He is alert and oriented to person, place, and time  Cranial Nerves: No cranial nerve deficit  Result Review  Labs:  Office Visit on 01/16/2023   Component Date Value Ref Range Status   • Specific Gravity 01/21/2023 1 013  1 005 - 1 030 Final   • Ph 01/21/2023 6 5  5 0 - 7 5 Final   • Color UA 01/21/2023 Yellow  Yellow Final   • Urine Appearance 01/21/2023 Clear  Clear Final   • Leukocyte Esterase 01/21/2023 Negative  Negative Final   • Protein 01/21/2023 Negative  Negative/Trace Final   • Glucose, 24 HR Urine 01/21/2023 Negative  Negative Final   • Ketone, Urine 01/21/2023 Negative  Negative Final   • Blood, Urine 01/21/2023 Negative  Negative Final   • Bilirubin, Urine 01/21/2023 Negative  Negative Final   • Urobilinogen Urine 01/21/2023 0 2  0 2 - 1 0 mg/dL Final   • SL AMB NITRITES URINE, QUAL  01/21/2023 Negative  Negative Final   • Microscopic Examination 01/21/2023 Comment   Final    Microscopic follows if indicated  • Microscopic Examination 01/21/2023 See below:   Final    Microscopic was indicated and was performed     • Creatinine, Urine 01/21/2023 75 8  Not Estab  mg/dL Final   • Total Protein, Urine 01/21/2023 15 7  Not Estab  mg/dL Final   • Prot/Creat Ratio, Ur 01/21/2023 207 (H)  0 - 200 mg/g creat Final   • White Blood Cell Count 01/21/2023 5 3  3 4 - 10 8 x10E3/uL Final   • Red Blood Cell Count 01/21/2023 4 77  4 14 - 5 80 x10E6/uL Final   • Hemoglobin 01/21/2023 12 1 (L)  13 0 - 17 7 g/dL Final   • HCT 01/21/2023 37 6  37 5 - 51 0 % Final   • MCV 01/21/2023 79  79 - 97 fL Final   • MCH 01/21/2023 25 4 (L)  26 6 - 33 0 pg Final   • MCHC 01/21/2023 32 2  31 5 - 35 7 g/dL Final   • RDW 01/21/2023 14 8  11 6 - 15 4 % Final   • Platelet Count 32/81/7144 210  150 - 450 x10E3/uL Final   • Neutrophils 01/21/2023 71  Not Estab  % Final   • Lymphocytes 01/21/2023 21  Not Estab  % Final   • Monocytes 01/21/2023 4  Not Estab  % Final   • Eosinophils 01/21/2023 2  Not Estab  % Final   • Basophils PCT 01/21/2023 1  Not Estab  % Final   • Neutrophils (Absolute) 01/21/2023 3 8  1 4 - 7 0 x10E3/uL Final   • Lymphocytes (Absolute) 01/21/2023 1 1  0 7 - 3 1 x10E3/uL Final   • Monocytes (Absolute) 01/21/2023 0 2  0 1 - 0 9 x10E3/uL Final   • Eosinophils (Absolute) 01/21/2023 0 1  0 0 - 0 4 x10E3/uL Final   • Basophils ABS 01/21/2023 0 0  0 0 - 0 2 x10E3/uL Final   • Immature Granulocytes 01/21/2023 1  Not Estab  % Final   • Immature Granulocytes (Absolute) 01/21/2023 0 0  0 0 - 0 1 x10E3/uL Final   • Hematology Comments 01/21/2023 Note:   Final    Verified by microscopic examination     • Glucose, Random 01/21/2023 87  70 - 99 mg/dL Final   • BUN 01/21/2023 12  6 - 20 mg/dL Final   • Creatinine 01/21/2023 0 87  0 76 - 1 27 mg/dL Final   • eGFR 01/21/2023 117  >59 mL/min/1 73 Final   • SL AMB BUN/CREATININE RATIO 01/21/2023 14  9 - 20 Final   • Sodium 01/21/2023 135  134 - 144 mmol/L Final   • Potassium 01/21/2023 4 1  3 5 - 5 2 mmol/L Final   • Chloride 01/21/2023 99  96 - 106 mmol/L Final   • CO2 01/21/2023 23  20 - 29 mmol/L Final   • CALCIUM 01/21/2023 9 1  8 7 - 10 2 mg/dL Final   • Protein, Total 01/21/2023 8 9 (H)  6 0 - 8 5 g/dL Final   • Albumin 01/21/2023 4 1  4 0 - 5 0 g/dL Final   • Globulin, Total 01/21/2023 4 8 (H)  1 5 - 4 5 g/dL Final   • Albumin/Globulin Ratio 01/21/2023 0 9 (L)  1 2 - 2 2 Final   • TOTAL BILIRUBIN 01/21/2023 0 4  0 0 - 1 2 mg/dL Final   • Alk Phos Isoenzymes 01/21/2023 75  44 - 121 IU/L Final   • AST 01/21/2023 30  0 - 40 IU/L Final   • ALT 01/21/2023 25  0 - 44 IU/L Final   • C-Reactive Protein, Quant 01/21/2023 10  0 - 10 mg/L Final   • Sedimentation Rate 01/21/2023 110 (H)  0 - 15 mm/hr Final   • C4, COMPLEMENT 01/21/2023 2 (L)  12 - 38 mg/dL Final   • C3 Complement 01/21/2023 77 (L)  82 - 167 mg/dL Final   • ds DNA Ab 01/21/2023 45 (H) 0 - 9 IU/mL Final    Comment:                                    Negative      <5                                     Equivocal  5 - 9                                     Positive      >9     • Anti JEREMIAS-1 01/21/2023 <0 2  0 0 - 0 9 AI Final   • GOLDEN RNP Ab 01/21/2023 >8 0 (H)  0 0 - 0 9 AI Final   • GOLDEN Rothman (SM) Ab 01/21/2023 >8 0 (H)  0 0 - 0 9 AI Final   • Scleroderma SCL-70 01/21/2023 0 2  0 0 - 0 9 AI Final   • Anti-Centromere B Antibodies 01/21/2023 <0 2  0 0 - 0 9 AI Final   • Histone Ab 01/21/2023 6 9 (H)  0 0 - 0 9 Units Final    Comment:                          Negative                <1 0                           Weak Positive      1 0 - 1 5                           Moderate Positive  1 6 - 2 5                           Strong Positive         >2 5     • Beta-2 Glyco 1 IgG 01/21/2023 <9  0 - 20 GPI IgG units Final    Comment: The reference interval reflects a 3SD or 99th percentile interval,  which is thought to represent a potentially clinically significant  result in accordance with the International Consensus Statement on  the classification criteria for definitive antiphospholipid syndrome  (APS)  J Thromb Haem 2006;4:295-306  • Beta-2 Glyco 1 IgA 01/21/2023 15  0 - 25 GPI IgA units Final    Comment: The reference interval reflects a 3SD or 99th percentile interval,  which is thought to represent a potentially clinically significant  result in accordance with the International Consensus Statement on  the classification criteria for definitive antiphospholipid syndrome  (APS)  J Thromb Haem 2006;4:295-306  • Beta-2 Glyco 1 IgM 01/21/2023 <9  0 - 32 GPI IgM units Final    Comment: The reference interval reflects a 3SD or 99th percentile interval,  which is thought to represent a potentially clinically significant  result in accordance with the International Consensus Statement on  the classification criteria for definitive antiphospholipid syndrome  (APS)  J Thromb Haem 2006;4:295-306       • Anticardiolipin IgG 01/21/2023 71 (H)  0 - 14 GPL U/mL Final    Comment:                           Negative:              <15                            Indeterminate:     15 - 20                            Low-Med Positive: >20 - 80                            High Positive:         >80     • Anticardiolipin IgM 01/21/2023 38 (H)  0 - 12 MPL U/mL Final    Comment:                           Negative:              <13                            Indeterminate:     13 - 20                            Low-Med Positive: >20 - 80                            High Positive:         >80     • Anticardiolipin IgA 01/21/2023 <9  0 - 11 APL U/mL Final    Comment:                           Negative:              <12                            Indeterminate:     12 - 20                            Low-Med Positive: >20 - 80                            High Positive:         >80     • DILUTE PROTHROMBIN TIME(DPT) 01/21/2023 31 2  0 0 - 47 6 sec Final   • DPT CONFIRM RATIO 01/21/2023 0 99  0 00 - 1 34 Ratio Final   • THROMBIN TIME (DRVW) 01/21/2023 16 4  0 0 - 23 0 sec Final   • PTT Lupus Anticoagulant 01/21/2023 31 6  0 0 - 51 9 sec Final    Comment: **Effective February 6, 2023 PTT-LA reference**    interval will be changing to: 0 0 - 43 5 sec     • Dilute Viper Venom Time 01/21/2023 32 8  0 0 - 47 0 sec Final   • LUPUS REFLEX INTERPRETATION 01/21/2023 Comment:   Final    No lupus anticoagulant was detected  • Creatine Kinase, Total 01/21/2023 32 (L)  49 - 439 U/L Final   • 25-HYDROXY VIT D 01/21/2023 14 2 (L)  30 0 - 100 0 ng/mL Final    Comment: Vitamin D deficiency has been defined by the [facility] [address] practice guideline as a  level of serum 25-OH vitamin D less than 20 ng/mL (1,2)  The Endocrine Society went on to further define vitamin D  insufficiency as a level between 21 and 29 ng/mL (2)  1  IOM ([employer])  2010  Dietary reference     intakes for calcium and D   [facility]: The     [employer]  2  Fernando MF, Willian NC, Benita KEN, et al      Evaluation, treatment, and prevention of vitamin D     deficiency: an Endocrine Society clinical practice     guideline  JCEM  2011 Jul; 96(2):1911-30  • Ferritin 01/21/2023 236  30 - 400 ng/mL Final   • LDH 01/21/2023 269 (H)  121 - 224 IU/L Final   • Albumin, Electrophoresis 01/21/2023 3 3  2 9 - 4 4 g/dL Final   • Alpha-1 Globulin 01/21/2023 0 3  0 0 - 0 4 g/dL Final   • Alpha-2 Globulin 01/21/2023 0 9  0 4 - 1 0 g/dL Final   • Beta Globulin 01/21/2023 0 8  0 7 - 1 3 g/dL Final   • Gamma Globulin 01/21/2023 3 6 (H)  0 4 - 1 8 g/dL Final   • M-Uziel 01/21/2023 Not Observed  Not Observed g/dL Final   • Globulin 01/21/2023 5 6 (H)  2 2 - 3 9 g/dL Final   • Albumin/Globulin Ratio 01/21/2023 0 6 (L)  0 7 - 1 7 Final   • Please note 01/21/2023 Comment   Final    Comment: Protein electrophoresis scan will follow via computer, mail, or   delivery  • Interpretation 01/21/2023 Comment   Final    Comment: 1)  No diagnostic immunophenotypic abnormality detected  2)  Unable to evaluate B-cell clonality, see comment     • Comments 01/21/2023 Comment   Final    Comment: Unable to evaluate B-cell clonality due to nonspecific light chain binding  Nonspecific light chain binding can sometimes be seen in the setting of  increased serum proteins  Could consider B cell gene rearrangement studies  to exclude a monoclonal B cell population if clinically indicated  Repeating this study may also be useful  Clinical correlation is  recommended  • SL AMB CLINICAL INFORMATION 01/21/2023 Comment   Final    Comment: Accompanying CBC dated 01/21/2023 shows: WBC count 5 3, Hgb 12 1, Rachel 3 8,  Lym 1 1, Mon 0 2       • Specimen Type 01/21/2023 Comment   Final    Peripheral blood   • Assessment of Leukocytes 01/21/2023 Comment   Final    Comment: Kappa and lambda staining cannot be interpreted due to nonspecific light  chain binding  There is no loss of, or aberrant expression of, the pan T cell antigens to  suggest a neoplastic T cell process  CD4:CD8 ratio 1 9  No circulating blasts are detected  Rare granulocytes show left-shifted maturation  Analysis of the leukocyte population shows: granulocytes 84%, monocytes 3%,  lymphocytes 13%, blasts <0 1%, B cells 2%, T cells 9%, NK cells 2%  • Viability 01/21/2023 Comment   Final    97%   • Analysis and Gating Strategy 01/21/2023 Comment   Final    8 color analysis with CD45/SSC gating   • Phenotype Chart 01/21/2023 Comment   Final    Comment: CD2       Normal         CD3       Normal  CD4       Normal         CD5       Normal  CD7       Normal         CD8       Normal  CD10      Normal         CD11b     Normal  CD13      Normal         CD14      Normal  CD16      Normal         CD19      Normal  CD20      Normal         CD33      Normal  CD34      Normal         CD38      Normal  CD45      Normal         CD56      Normal  CD57      Normal              Normal  HLA-DR    Normal         KAPPA     See Text  LAMBDA    See Text       CD64      Normal     • Pathologist 01/21/2023 Comment   Final    Nalini Fuentes, M D    • Comments 01/21/2023 Comment   Final    Comment: Each antibody in this assay was utilized to assess for potential  abnormalities of studied cell populations or to characterize  identified abnormalities  This test was developed and its performance characteristics determined  by Chris Herrera   It has not been cleared or approved by the [place]  Food and  Drug Administration  The FDA has determined that such clearance or approval is not  necessary  This test is used for clinical purposes  It should not be  regarded as investigational or for research       • SL AMB WBC, URINE 01/21/2023 None seen  0 - 5 /hpf Final   • RBC, Urine 01/21/2023 None seen  0 - 2 /hpf Final   • Epithelial Cells (non renal) 01/21/2023 None seen  0 - 10 /hpf Final   • Casts 01/21/2023 None seen  None seen /lpf Final   • Bacteria, Urine 01/21/2023 None seen  None seen/Few Final         Please note: This report has been generated by a voice recognition software system  Therefore there may be syntax, spelling, and/or grammatical errors  Please call if you have any questions

## 2023-03-21 ENCOUNTER — TELEPHONE (OUTPATIENT)
Dept: HEMATOLOGY ONCOLOGY | Facility: MEDICAL CENTER | Age: 33
End: 2023-03-21

## 2023-03-21 ENCOUNTER — HOSPITAL ENCOUNTER (OUTPATIENT)
Dept: RADIOLOGY | Facility: HOSPITAL | Age: 33
Discharge: HOME | End: 2023-03-21

## 2023-03-21 DIAGNOSIS — R59.1 LYMPHADENOPATHY: ICD-10-CM

## 2023-03-21 DIAGNOSIS — R61 NIGHT SWEATS: ICD-10-CM

## 2023-03-21 RX ADMIN — IOHEXOL 100 ML: 350 INJECTION, SOLUTION INTRAVENOUS at 09:29

## 2023-03-21 NOTE — TELEPHONE ENCOUNTER
I spoke with the patient in regards to his lab work that needs to be completed prior to his appt on 3/29/23 at 2:40pm  Patient states he will get it done as soon as possible

## 2023-03-25 LAB
ALBUMIN SERPL-MCNC: NORMAL G/DL
ALBUMIN/GLOB SERPL: NORMAL {RATIO}
ALP SERPL-CCNC: NORMAL U/L
ALT SERPL-CCNC: NORMAL U/L
AST SERPL-CCNC: NORMAL U/L
BASOPHILS # BLD AUTO: 0 X10E3/UL (ref 0–0.2)
BASOPHILS NFR BLD AUTO: 0 %
BILIRUB SERPL-MCNC: NORMAL MG/DL
BUN SERPL-MCNC: NORMAL MG/DL
BUN/CREAT SERPL: NORMAL
CALCIUM SERPL-MCNC: NORMAL MG/DL
CHLORIDE SERPL-SCNC: NORMAL MMOL/L
CO2 SERPL-SCNC: NORMAL MMOL/L
CREAT SERPL-MCNC: NORMAL MG/DL
EGFR: NORMAL
EOSINOPHIL # BLD AUTO: 0.1 X10E3/UL (ref 0–0.4)
EOSINOPHIL NFR BLD AUTO: 1 %
ERYTHROCYTE [DISTWIDTH] IN BLOOD BY AUTOMATED COUNT: 15.3 % (ref 11.6–15.4)
FERRITIN SERPL-MCNC: NORMAL NG/ML
GLOBULIN SER-MCNC: NORMAL G/DL
GLUCOSE SERPL-MCNC: NORMAL MG/DL
HCT VFR BLD AUTO: 37.6 % (ref 37.5–51)
HGB BLD-MCNC: 12.5 G/DL (ref 13–17.7)
IGA SERPL-MCNC: NORMAL MG/DL
IGG SERPL-MCNC: NORMAL MG/DL
IGM SERPL-MCNC: NORMAL MG/DL
IMM GRANULOCYTES # BLD: 0.1 X10E3/UL (ref 0–0.1)
IMM GRANULOCYTES NFR BLD: 1 %
IRON SATN MFR SERPL: NORMAL %
IRON SERPL-MCNC: NORMAL UG/DL
KAPPA LC FREE SER-MCNC: NORMAL MG/L
KAPPA LC FREE/LAMBDA FREE SER: NORMAL {RATIO}
LAMBDA LC FREE SERPL-MCNC: NORMAL MG/L
LYMPHOCYTES # BLD AUTO: 1.4 X10E3/UL (ref 0.7–3.1)
LYMPHOCYTES NFR BLD AUTO: 18 %
MCH RBC QN AUTO: 26.5 PG (ref 26.6–33)
MCHC RBC AUTO-ENTMCNC: 33.2 G/DL (ref 31.5–35.7)
MCV RBC AUTO: 80 FL (ref 79–97)
MONOCYTES # BLD AUTO: 0.4 X10E3/UL (ref 0.1–0.9)
MONOCYTES NFR BLD AUTO: 4 %
NEUTROPHILS # BLD AUTO: 5.9 X10E3/UL (ref 1.4–7)
NEUTROPHILS NFR BLD AUTO: 76 %
PLATELET # BLD AUTO: 251 X10E3/UL (ref 150–450)
POTASSIUM SERPL-SCNC: NORMAL MMOL/L
PROT SERPL-MCNC: NORMAL G/DL
RBC # BLD AUTO: 4.72 X10E6/UL (ref 4.14–5.8)
SODIUM SERPL-SCNC: NORMAL MMOL/L
TIBC SERPL-MCNC: NORMAL UG/DL
UIBC SERPL-MCNC: NORMAL UG/DL
WBC # BLD AUTO: 7.9 X10E3/UL (ref 3.4–10.8)

## 2023-03-27 LAB
25(OH)D3+25(OH)D2 SERPL-MCNC: 15.5 NG/ML (ref 30–100)
ALBUMIN SERPL-MCNC: 4 G/DL (ref 4–5)
ALBUMIN SERPL-MCNC: 4.1 G/DL (ref 4–5)
ALBUMIN/GLOB SERPL: 1 {RATIO} (ref 1.2–2.2)
ALBUMIN/GLOB SERPL: 1.1 {RATIO} (ref 1.2–2.2)
ALP SERPL-CCNC: 74 IU/L (ref 44–121)
ALP SERPL-CCNC: 75 IU/L (ref 44–121)
ALT SERPL-CCNC: 16 IU/L (ref 0–44)
ALT SERPL-CCNC: 16 IU/L (ref 0–44)
APPEARANCE UR: CLEAR
AST SERPL-CCNC: 18 IU/L (ref 0–40)
AST SERPL-CCNC: 20 IU/L (ref 0–40)
BACTERIA URNS QL MICRO: NORMAL
BASOPHILS # BLD AUTO: 0 X10E3/UL (ref 0–0.2)
BASOPHILS NFR BLD AUTO: 0 %
BILIRUB SERPL-MCNC: <0.2 MG/DL (ref 0–1.2)
BILIRUB SERPL-MCNC: <0.2 MG/DL (ref 0–1.2)
BILIRUB UR QL STRIP: NEGATIVE
BUN SERPL-MCNC: 14 MG/DL (ref 6–20)
BUN SERPL-MCNC: 15 MG/DL (ref 6–20)
BUN/CREAT SERPL: 16 (ref 9–20)
BUN/CREAT SERPL: 19 (ref 9–20)
C3 SERPL-MCNC: 113 MG/DL (ref 82–167)
C4 SERPL-MCNC: 8 MG/DL (ref 12–38)
CALCIUM SERPL-MCNC: 9.2 MG/DL (ref 8.7–10.2)
CALCIUM SERPL-MCNC: 9.4 MG/DL (ref 8.7–10.2)
CARDIOLIPIN IGA SER IA-ACNC: <9 APL U/ML (ref 0–11)
CARDIOLIPIN IGG SER IA-ACNC: 9 GPL U/ML (ref 0–14)
CARDIOLIPIN IGM SER IA-ACNC: <9 MPL U/ML (ref 0–12)
CASTS URNS QL MICRO: NORMAL /LPF
CHLORIDE SERPL-SCNC: 103 MMOL/L (ref 96–106)
CHLORIDE SERPL-SCNC: 103 MMOL/L (ref 96–106)
CO2 SERPL-SCNC: 22 MMOL/L (ref 20–29)
CO2 SERPL-SCNC: 23 MMOL/L (ref 20–29)
COLOR UR: YELLOW
CREAT SERPL-MCNC: 0.81 MG/DL (ref 0.76–1.27)
CREAT SERPL-MCNC: 0.89 MG/DL (ref 0.76–1.27)
CREAT UR-MCNC: 173.6 MG/DL
CRP SERPL-MCNC: 10 MG/L (ref 0–10)
DSDNA AB SER-ACNC: 13 IU/ML (ref 0–9)
EGFR: 116 ML/MIN/1.73
EGFR: 119 ML/MIN/1.73
EOSINOPHIL # BLD AUTO: 0.1 X10E3/UL (ref 0–0.4)
EOSINOPHIL NFR BLD AUTO: 1 %
EPI CELLS #/AREA URNS HPF: NORMAL /HPF (ref 0–10)
ERYTHROCYTE [DISTWIDTH] IN BLOOD BY AUTOMATED COUNT: 15.8 % (ref 11.6–15.4)
ERYTHROCYTE [SEDIMENTATION RATE] IN BLOOD BY WESTERGREN METHOD: 28 MM/HR (ref 0–15)
FERRITIN SERPL-MCNC: 174 NG/ML (ref 30–400)
GLOBULIN SER-MCNC: 3.8 G/DL (ref 1.5–4.5)
GLOBULIN SER-MCNC: 4 G/DL (ref 1.5–4.5)
GLUCOSE SERPL-MCNC: 94 MG/DL (ref 70–99)
GLUCOSE SERPL-MCNC: 95 MG/DL (ref 70–99)
GLUCOSE UR QL: NEGATIVE
HCT VFR BLD AUTO: 38.5 % (ref 37.5–51)
HGB BLD-MCNC: 12.6 G/DL (ref 13–17.7)
HGB UR QL STRIP: NEGATIVE
IGA SERPL-MCNC: 280 MG/DL (ref 90–386)
IGG SERPL-MCNC: 2338 MG/DL (ref 603–1613)
IGM SERPL-MCNC: 88 MG/DL (ref 20–172)
IMM GRANULOCYTES # BLD: 0.1 X10E3/UL (ref 0–0.1)
IMM GRANULOCYTES NFR BLD: 1 %
IRON SATN MFR SERPL: 11 % (ref 15–55)
IRON SERPL-MCNC: 40 UG/DL (ref 38–169)
KETONES UR QL STRIP: NEGATIVE
LEUKOCYTE ESTERASE UR QL STRIP: NEGATIVE
LYMPHOCYTES # BLD AUTO: 1.1 X10E3/UL (ref 0.7–3.1)
LYMPHOCYTES NFR BLD AUTO: 16 %
MCH RBC QN AUTO: 26.4 PG (ref 26.6–33)
MCHC RBC AUTO-ENTMCNC: 32.7 G/DL (ref 31.5–35.7)
MCV RBC AUTO: 81 FL (ref 79–97)
MICRO URNS: NORMAL
MICRO URNS: NORMAL
MONOCYTES # BLD AUTO: 0.3 X10E3/UL (ref 0.1–0.9)
MONOCYTES NFR BLD AUTO: 4 %
NEUTROPHILS # BLD AUTO: 5.6 X10E3/UL (ref 1.4–7)
NEUTROPHILS NFR BLD AUTO: 78 %
NITRITE UR QL STRIP: NEGATIVE
PH UR STRIP: 6 [PH] (ref 5–7.5)
PLATELET # BLD AUTO: 249 X10E3/UL (ref 150–450)
POTASSIUM SERPL-SCNC: 4.9 MMOL/L (ref 3.5–5.2)
POTASSIUM SERPL-SCNC: 5 MMOL/L (ref 3.5–5.2)
PROT SERPL-MCNC: 7.9 G/DL (ref 6–8.5)
PROT SERPL-MCNC: 8 G/DL (ref 6–8.5)
PROT UR QL STRIP: NEGATIVE
PROT UR-MCNC: 20 MG/DL
PROT/CREAT UR: 115 MG/G CREAT (ref 0–200)
RBC # BLD AUTO: 4.77 X10E6/UL (ref 4.14–5.8)
RBC #/AREA URNS HPF: NORMAL /HPF (ref 0–2)
SODIUM SERPL-SCNC: 139 MMOL/L (ref 134–144)
SODIUM SERPL-SCNC: 140 MMOL/L (ref 134–144)
SP GR UR: 1.03 (ref 1–1.03)
TIBC SERPL-MCNC: 356 UG/DL (ref 250–450)
UIBC SERPL-MCNC: 316 UG/DL (ref 111–343)
UROBILINOGEN UR STRIP-ACNC: 0.2 MG/DL (ref 0.2–1)
WBC # BLD AUTO: 7.2 X10E3/UL (ref 3.4–10.8)
WBC #/AREA URNS HPF: NORMAL /HPF (ref 0–5)

## 2023-03-28 ENCOUNTER — TELEPHONE (OUTPATIENT)
Dept: HEMATOLOGY ONCOLOGY | Facility: MEDICAL CENTER | Age: 33
End: 2023-03-28

## 2023-03-28 LAB
KAPPA LC FREE SER-MCNC: 64.8 MG/L (ref 3.3–19.4)
KAPPA LC FREE/LAMBDA FREE SER: 1.55 {RATIO} (ref 0.26–1.65)
LAMBDA LC FREE SERPL-MCNC: 41.9 MG/L (ref 5.7–26.3)

## 2023-03-28 NOTE — TELEPHONE ENCOUNTER
I spoke with Yair Murcia from radiology reading room in regards to having the patients CT from 3/21/23 read prior to his appt on 3/29/23  Yair Murcia states he will submit and start working on it

## 2023-03-29 ENCOUNTER — OFFICE VISIT (OUTPATIENT)
Dept: HEMATOLOGY ONCOLOGY | Facility: MEDICAL CENTER | Age: 33
End: 2023-03-29

## 2023-03-29 VITALS
BODY MASS INDEX: 31.55 KG/M2 | TEMPERATURE: 98.5 F | RESPIRATION RATE: 18 BRPM | WEIGHT: 208.2 LBS | DIASTOLIC BLOOD PRESSURE: 72 MMHG | SYSTOLIC BLOOD PRESSURE: 118 MMHG | OXYGEN SATURATION: 99 % | HEART RATE: 95 BPM | HEIGHT: 68 IN

## 2023-03-29 DIAGNOSIS — R59.1 LYMPHADENOPATHY: Primary | ICD-10-CM

## 2023-03-29 NOTE — PROGRESS NOTES
Marilou Lomeli  1990  [facility] [place] [facility] [place] [id number] [facility] [id number]    DISCUSSION/SUMMARY:    45-year-old male with otherwise good general health recently diagnosed with lupus  Recent scans demonstrated mildly enlarged lymph nodes in the chest and abdomen/pelvis  The lymph nodes were not terribly enlarged, 13 mm  Patient also had a slightly enlarged spleen  Patient initially presented with night sweats; these have abated with steroid treatment  Patient feels well and clinically there are no concerning hematology findings, only 1 left occipital lymph node is to be slightly larger/palpable as compared to the others  Recent blood work did not demonstrate any evidence of a monoclonal population or evidence for lymphoma  We discussed options  I do not believe that a more extensive work-up including lymph node biopsy and/or bone marrow biopsy is needed now - patient agrees  Mr Racheal Bryant demonstrated an excellent understanding of the situation  Patient understands that the slightly enlarged lymph nodes may be from the rheumatologic disorder  Patient also understands that SLE patients in general have an increased risk of developing either Hodgkin's or non-Hodgkin's lymphoma  We discussed what to watch for including return of the night sweats, unplanned weight loss, recurrent infections, enlarging lymph nodes, worsening body aches etc   Return to hematology is now on an as-needed basis but Mr Racheal Bryant knows to call the hematology/oncology office if there are any other questions or concerns  Carefully review your medication list and verify that the list is accurate and up-to-date   Please call the hematology/oncology office if there are medications missing from the list, medications on the list that you are not currently taking or if there is a dosage or instruction that is different from how you're taking that medication  Patient goals and areas of care: Follow-up with rheumatology  Barriers to care: none  Patient is able to self-care  ____________________________________________________________________________________    Chief Complaint   Patient presents with   • Follow-up   • Lymphadenopathy, night sweats     History of Present Illness: 77-year-old male recently seen by MOE GOOD  Patient was referred for evaluation of lymphadenopathy and night sweats  Patient has a history of lupus diagnosed in January 2023  Patient was in good general health up until August 2022 when he began to have arthritic complaints  Patient was seen by his PCP, work-up demonstrated + NOEMY  Patient was referred to rheumatology  Presently Mr Jaycob Lyn states feeling quite well  Patient states that the night sweats went away with the prednisone  No fevers, chills or sweats  No respiratory issues or abdominal pain  Appetite is good, weight is stable  Activities are baseline  No headaches, blurred vision or dizziness  Review of Systems   Constitutional: Negative  HENT: Negative  Eyes: Negative  Respiratory: Negative  Cardiovascular: Negative  Gastrointestinal: Negative  Endocrine: Negative  Genitourinary: Negative  Musculoskeletal: Negative  Skin: Negative  Allergic/Immunologic: Negative  Neurological: Negative  Hematological: Negative  Psychiatric/Behavioral: Negative  All other systems reviewed and are negative  Patient Active Problem List   Diagnosis   • Lymphadenopathy     No past medical history on file  No past surgical history on file  No family history on file    Social History     Socioeconomic History   • Marital status: /Civil Union     Spouse name: Not on file   • Number of children: Not on file   • Years of education: Not on file   • Highest education level: Not on file   Occupational History   • Not on file   Tobacco Use   • Smoking status: Never   • Smokeless tobacco: Never   Substance and Sexual Activity   • Alcohol use: Yes     Comment: social use only   • Drug use: Never   • Sexual activity: Yes   Other Topics Concern   • Not on file   Social History Narrative   • Not on file     Social Determinants of Health     Financial Resource Strain: Not on file   Food Insecurity: Not on file   Transportation Needs: Not on file   Physical Activity: Not on file   Stress: Not on file   Social Connections: Not on file   Intimate Partner Violence: Not on file   Housing Stability: Not on file       Current Outpatient Medications:   •  ergocalciferol (VITAMIN D2) 50,000 units, Take 1 capsule (50,000 Units total) by mouth once a week, Disp: 12 capsule, Rfl: 0  •  hydroxychloroquine (PLAQUENIL) 200 mg tablet, Take 1 tablet (200 mg total) by mouth 2 (two) times a day with meals, Disp: 180 tablet, Rfl: 1  •  predniSONE 10 mg tablet, Take 4 tabs once daily x 1 week, then 3 tabs once daily x 1 week, then 2 tabs once daily x 1 week, then 1 tab once daily x 1 week and stop  (Patient not taking: Reported on 3/29/2023), Disp: 70 tablet, Rfl: 0    Allergies   Allergen Reactions   • Cat Hair Extract Sneezing   • Other Sneezing     Environmental  allergies     Vitals:    03/29/23 1434   BP: 118/72   Pulse: 95   Resp: 18   Temp: 98 5 °F (36 9 °C)   SpO2: 99%     Physical Exam  Constitutional:       Appearance: He is well-developed  HENT:      Head: Normocephalic and atraumatic  Right Ear: External ear normal       Left Ear: External ear normal    Eyes:      Conjunctiva/sclera: Conjunctivae normal       Pupils: Pupils are equal, round, and reactive to light  Cardiovascular:      Rate and Rhythm: Normal rate and regular rhythm  Heart sounds: Normal heart sounds  Pulmonary:      Effort: Pulmonary effort is normal       Breath sounds: Normal breath sounds  Abdominal:      General: Bowel sounds are normal       Palpations: Abdomen is soft     Musculoskeletal:         General: Normal range of motion  Cervical back: Normal range of motion and neck supple  Skin:     General: Skin is warm  Comments: Warm, moist, good color, no petechiae or ecchymoses   Neurological:      Mental Status: He is alert and oriented to person, place, and time  Deep Tendon Reflexes: Reflexes are normal and symmetric  Psychiatric:         Behavior: Behavior normal          Thought Content: Thought content normal          Judgment: Judgment normal      Extremities: Lower extreme edema bilaterally, no cords, pulses are 1+  Lymphatics: Patient has 1 palpable left occipital lymph node, otherwise no adenopathy in the pre-/postauricular area, right occipital area, supraclavicular area, axilla and groin bilaterally    Labs    3/25/2023 WBC = 7 9 hemoglobin = 12 5 hematocrit = 37 6 MCV = 80 platelet = 122 neutrophil = 76% lymphocyte = 18% monocyte = 4% BUN = 15 creatinine = 0 81 calcium = 9 2 total protein = 7 9 albumin = 4 1 LFTs WNL IgG = 2338 IgA = 280 IgM = 88 ferritin = 174 TIBC = 356 iron = 40 iron saturation = 11%    3/25/2023 ESR = 28    3/25/2023 [place] free light chain = 65 lambda free light chain = 42 Kappa/lambda ratio = 1 55    1/21/2023 SPEP: M spike not observed, globulin = 5 6 = elevated    1/21/2023 LDH = 269    1/21/2023 beta-2 glycoprotein 1 IgG, IgA and IgM within normal limits    Imaging    3/21/2023 CAT scan chest abdomen pelvis    IMPRESSION:     Mildly enlarged lymphadenopathy throughout the chest, abdomen and pelvis with the largest nodes in the axillary and supraclavicular regions measuring up to 13 mm in short axis, and the largest largest retroperitoneal/pelvic/inguinal nodes measuring up to 11 mm in short axis  Mild splenomegaly to 13 3 cm  Serologic correlation for an underlying lymphoproliferative disorder and/or autoimmune disease is recommended  Alternatively, the findings could represent reactive process related to viral infection      3/21/2023 CAT scan soft tissue neck    IMPRESSION:     No discrete mass lesion along the aerodigestive tract      Symmetric bilateral cervical adenopathy as discussed above  Serologic correlation for an underlying lymphoproliferative disorder and/or autoimmune disease is recommended  An additional nonneoplastic imaging diagnostic differential consideration would   include viral infection  Pathology    1/21/2023 leukemia/lymphoma flow cytometry: No diagnostic immunophenotypic abnormality detected

## 2023-05-11 ENCOUNTER — OFFICE VISIT (OUTPATIENT)
Dept: RHEUMATOLOGY | Facility: CLINIC | Age: 33
End: 2023-05-11

## 2023-05-11 VITALS — HEART RATE: 94 BPM | DIASTOLIC BLOOD PRESSURE: 81 MMHG | SYSTOLIC BLOOD PRESSURE: 133 MMHG

## 2023-05-11 DIAGNOSIS — Z79.899 LONG-TERM USE OF PLAQUENIL: ICD-10-CM

## 2023-05-11 DIAGNOSIS — M32.9 SYSTEMIC LUPUS ERYTHEMATOSUS, UNSPECIFIED SLE TYPE, UNSPECIFIED ORGAN INVOLVEMENT STATUS (HCC): Primary | ICD-10-CM

## 2023-05-11 DIAGNOSIS — E55.9 VITAMIN D DEFICIENCY: ICD-10-CM

## 2023-05-11 NOTE — PROGRESS NOTES
Assessment and Plan:   Mr Marylen Arch a 66-year-old male originally from [place] with history significant for systemic lupus erythematosus diagnosed in January 2023 [based on a positive NOEMY>1:1280 speckled pattern, elevated double-stranded DNA antibody of 84, anti-RNP/Rothman antibody greater than 8, elevated antihistone antibody, hypocomplementemia, elevated anticardiolipin antibodies, constitutional symptoms, inflammatory arthritis, lymphadenopathy and Raynaud's] who presents for a follow-up  He is currently on hydroxychloroquine 200 mg once daily or every other day that was initially started in January 2023      # Systemic lupus erythematosus  - Fidelia Souza presents today for a follow-up of systemic lupus erythematosus for which he is currently on hydroxychloroquine 200 mg once daily or every other day which he self decreased due to concerns for retinal toxicity and as he has been feeling stable from a lupus perspective  I discussed with him my recommendations are to take the tablet twice daily in order to minimize inflammation from the autoimmune disease and prevent complications  He is understanding of this but states that he has concerns for retinal toxicity  He will continue annual follow-ups with ophthalmology  He will update lupus related lab monitoring in the next month      # Vitamin D deficiency  - He has completed a course of vitamin D 50,000 international units once weekly for 12 weeks  I will update a vitamin D level and guide dosing based on this  Plan:  Diagnoses and all orders for this visit:    Systemic lupus erythematosus, unspecified SLE type, unspecified organ involvement status ([facility] [address] )  -     CBC and differential; Future  -     Comprehensive metabolic panel; Future  -     C-reactive protein; Future  -     Sedimentation rate, automated; Future  -     C4 complement; Future  -     C3 complement; Future  -     Anti-DNA antibody, double-stranded;  Future  -     Urinalysis with microscopic  - Protein / creatinine ratio, urine  -     CBC and differential  -     Comprehensive metabolic panel  -     C-reactive protein  -     Sedimentation rate, automated  -     C4 complement  -     C3 complement  -     Anti-DNA antibody, double-stranded    Long-term use of Plaquenil    Vitamin D deficiency  -     Vitamin D 25 hydroxy; Future  -     Vitamin D 25 hydroxy      Activities as tolerated  Exercise: try to maintain a low impact exercise regimen as much as possible  Walk for 30 minutes a day for at least 3 days a week  Continue other medications as prescribed by PCP and other specialists  RTC in 4 months          HPI    INITIAL VISIT NOTE (1/2023):  Mr Jose Luis Melgar a 51-year-old male originally from [place] with no significant past medical history who presents for an evaluation of a positive NOEMY >1:1280 speckled pattern and elevated double-stranded DNA antibody of 84 that were detected as a result of bilateral hand pain and swelling   He is referred by Dr Joseph Cortes for a rheumatology consult      Patient reports he was in his usual state of health up until the summer 2022 and reports for at least 15 years prior to this he had not had to see a physician  Sara Lee that time he developed abrupt onset of bilateral hand pain, swelling and morning stiffness where he was unable to make a fist   He reports running his hands under hot water in the morning would help   The prominent symptoms lasted through August and he reports following this the hand complaints spontaneously resolved and is currently 90% improved   At times he has noticed pain also occurring in his wrists and very uncommonly in his knees but generally he is not experiencing concerning joint pain   No involvement of his elbows, shoulders, hips, ankles or feet   He reports gradually since last year he has been experiencing diffuse muscle pain affecting the entirety of his upper and lower extremities   He reports with prolonged duration of being seated the symptoms worsen   This has been a fairly constant symptom   He did try naproxen prescribed by his primary care physician which was ineffective  [facility] has not taken any over-the-counter pain medications      He has been experiencing multiple additional complaints since last July also including fatigue which is worse towards the end of the day, night sweats which has been severe on at least 3-4 occasions where he has woken up drenched, swollen lymph nodes which he has appreciated in his neck on both the anterior and posterior aspects [which he thinks becomes more prominent when he has episodes of the night sweats], mild hair loss which was occurring if he ran his fingers through his hair and has overall improved as well as symptoms consistent with Raynaud's affecting his hands where on cold exposure it will initially turn white and with rewarming change to purple/blue and then red   He reports from the summer till December 2022 he lost 15 pounds unintentionally but his current weight is stable at 197 pounds      He denies fevers, dry eyes, dry mouth, inflammatory eye disease, recurrent skin rash [reports over the past few dwyer he has appreciated a pimple type of rash on the inner aspects of his bilateral thighs which resolves spontaneously], psoriasis, photosensitivity, mouth/nose ulcers, pleuritic chest pain, shortness of breath, cough, inflammatory bowel disease [reports a history of treated H  pylori infection 10 years ago], blood clots or a family history of autoimmune disease [although he is unaware of his paternal family history]       In view of these symptoms he was evaluated by his primary care physician in September 2022 and had blood work done which showed the positive NOEMY and double-stranded DNA antibody   An ESR and CRP were elevated at 78 and 14, respectively   A CBC, CMP, Sjogren's antibodies, Lyme antibody profile, HIV, rheumatoid factor, anti-CCP antibody, hepatitis C antibody and TSH were unremarkable         1/27/2023:  Patient presents for a follow-up of systemic lupus erythematosus  He is currently on hydroxychloroquine 200 mg twice daily that was started at the initial office visit  I reviewed his blood work done after the visit which shows an elevated ESR of 110 with hypocomplementemia with a C3 and C4 decreased at 77 and 2, respectively  A double-stranded DNA antibody was elevated at 45  An anti-RNP and Rothman antibodies were greater than 8  An antihistone antibody was elevated at 6 9  An anticardiolipin IgG and IgM antibodies were elevated at 71 and 38, respectively  Vitamin D levels were low at 14 2  LDH was slightly elevated at 269  A CBC, CMP, C-reactive protein, urine analysis, urine protein creatinine ratio, remainder of the NOEMY specificity, beta-2 glycoprotein antibodies, lupus anticoagulant, CK, ferritin, SPEP and leukemia/lymphoma flow cytometry were unremarkable      He is not reporting any new complaints and we scheduled a follow-up appointment to review his results  5/11/2023:  Patient presents for a follow-up of systemic lupus erythematosus  He is currently on hydroxychloroquine that he takes as 200 mg once daily or every other day  He completed a prednisone taper from 1/27 till 2/27 and has otherwise not been on steroids  I reviewed his labs done on 3/25 which showed a slightly elevated ESR of 28  A double-stranded DNA antibody was elevated at 13  A C4 was slightly low at 8  A vitamin D level was low at 15 5  A CBC, CMP, C-reactive protein, urine analysis, urine protein creatinine ratio, C3 and anticardiolipin antibodies were normal   He was also seen by hematology/oncology and had a CT soft tissue neck done which showed symmetric bilateral cervical adenopathy    A CT chest/abdomen/pelvis showed mildly enlarged lymphadenopathy throughout the chest, abdomen and pelvis with the largest nodes in the axillary and supraclavicular regions measuring up to 13 mm in short axis and the largest retroperitoneal/pelvic/inguinal lymph nodes measuring up to 11 mm in short axis  Mild splenomegaly of 13 3 cm was noted  After his evaluation with hematology/oncology the lymphadenopathy was thought to be secondary to his underlying diagnosis of lupus and there were no concerns for malignancy  He does not require a scheduled follow-up appointment with them  He reports since completing the steroid course and starting hydroxychloroquine he has been doing well  He still notes some lymph nodes at the back of his neck and will experience joint pains occasionally  No significant or consistent complaints at this time  He is also describing symptoms of left heel pain which occurs in the morning or after being seated for a prolonged duration of time  This improves after taking the first few steps  Initially he was started on hydroxychloroquine at 200 mg twice daily which he took for the first few months  After he started noticing an improvement in his symptoms and due to concerns for retinal toxicity he decreased the hydroxychloroquine to 200 mg once daily or every other day  At times he will notice a slight flareup in his symptoms but this improves after he takes the hydroxychloroquine  The following portions of the patient's history were reviewed and updated as appropriate: allergies, current medications, past family history, past medical history, past social history, past surgical history and problem list       Review of Systems  Constitutional: Negative for weight change, fevers, chills, night sweats, fatigue  ENT/Mouth: Negative for hearing changes, ear pain, nasal congestion, sinus pain, hoarseness, sore throat, rhinorrhea, swallowing difficulty  Eyes: Negative for pain, redness, discharge, vision changes  Cardiovascular: Negative for chest pain, SOB, palpitations  Respiratory: Negative for cough, sputum, wheezing, dyspnea     Gastrointestinal: Negative for nausea, vomiting, diarrhea, constipation, pain, heartburn  Genitourinary: Negative for dysuria, urinary frequency, hematuria  Musculoskeletal: As per HPI  Skin: Negative for skin rash, color changes  Neuro: Negative for weakness, numbness, tingling, loss of consciousness  Psych: Negative for anxiety, depression  Heme/Lymph: Negative for easy bruising, bleeding  Positive for lymphadenopathy  History reviewed  No pertinent past medical history  History reviewed  No pertinent surgical history  Social History     Socioeconomic History   • Marital status: /Civil Union     Spouse name: Not on file   • Number of children: Not on file   • Years of education: Not on file   • Highest education level: Not on file   Occupational History   • Not on file   Tobacco Use   • Smoking status: Never   • Smokeless tobacco: Never   Substance and Sexual Activity   • Alcohol use: Yes     Comment: social use only   • Drug use: Never   • Sexual activity: Yes   Other Topics Concern   • Not on file   Social History Narrative   • Not on file     Social Determinants of Health     Financial Resource Strain: Not on file   Food Insecurity: Not on file   Transportation Needs: Not on file   Physical Activity: Not on file   Stress: Not on file   Social Connections: Not on file   Intimate Partner Violence: Not on file   Housing Stability: Not on file       History reviewed  No pertinent family history  Allergies   Allergen Reactions   • Cat Hair Extract Sneezing   • Other Sneezing     Environmental  allergies       Current Outpatient Medications:   •  hydroxychloroquine (PLAQUENIL) 200 mg tablet, Take 1 tablet (200 mg total) by mouth 2 (two) times a day with meals, Disp: 180 tablet, Rfl: 1      Objective:    Vitals:    05/11/23 1502   BP: 133/81   Pulse: 94       Physical Exam  General: Well appearing, well nourished, in no distress  Oriented x 3, normal mood and affect  Ambulating without difficulty    Skin: Good turgor, no rash, unusual bruising or prominent lesions  Hair: Normal texture and distribution  Nails: Normal color, no deformities  HEENT:  Head: Normocephalic, atraumatic  Eyes: Conjunctiva clear, sclera non-icteric, EOM intact  Extremities: No amputations or deformities, cyanosis, edema  Neurologic: Alert and oriented  No focal neurological deficits appreciated  Psychiatric: Normal mood and affect  AIME Tristan    Rheumatology

## 2023-07-25 DIAGNOSIS — M32.9 SYSTEMIC LUPUS ERYTHEMATOSUS, UNSPECIFIED SLE TYPE, UNSPECIFIED ORGAN INVOLVEMENT STATUS (HCC): ICD-10-CM

## 2023-07-25 RX ORDER — HYDROXYCHLOROQUINE SULFATE 200 MG/1
200 TABLET, FILM COATED ORAL 2 TIMES DAILY WITH MEALS
Qty: 180 TABLET | Refills: 1 | Status: SHIPPED | OUTPATIENT
Start: 2023-07-25 | End: 2024-01-21

## 2023-09-07 LAB
25(OH)D3+25(OH)D2 SERPL-MCNC: 17.8 NG/ML (ref 30–100)
ALBUMIN SERPL-MCNC: 4.4 G/DL (ref 4.1–5.1)
ALBUMIN/GLOB SERPL: 1.2 {RATIO} (ref 1.2–2.2)
ALP SERPL-CCNC: 82 IU/L (ref 44–121)
ALT SERPL-CCNC: 27 IU/L (ref 0–44)
APPEARANCE UR: CLEAR
AST SERPL-CCNC: 22 IU/L (ref 0–40)
BACTERIA URNS QL MICRO: NORMAL
BASOPHILS # BLD AUTO: 0 X10E3/UL (ref 0–0.2)
BASOPHILS NFR BLD AUTO: 0 %
BILIRUB SERPL-MCNC: 0.3 MG/DL (ref 0–1.2)
BILIRUB UR QL STRIP: NEGATIVE
BUN SERPL-MCNC: 13 MG/DL (ref 6–20)
BUN/CREAT SERPL: 14 (ref 9–20)
C3 SERPL-MCNC: 141 MG/DL (ref 82–167)
C4 SERPL-MCNC: 13 MG/DL (ref 12–38)
CALCIUM SERPL-MCNC: 9.8 MG/DL (ref 8.7–10.2)
CASTS URNS QL MICRO: NORMAL /LPF
CHLORIDE SERPL-SCNC: 101 MMOL/L (ref 96–106)
CO2 SERPL-SCNC: 24 MMOL/L (ref 20–29)
COLOR UR: YELLOW
CREAT SERPL-MCNC: 0.9 MG/DL (ref 0.76–1.27)
CREAT UR-MCNC: 130.7 MG/DL
CRP SERPL-MCNC: 6 MG/L (ref 0–10)
DSDNA AB SER-ACNC: 12 IU/ML (ref 0–9)
EGFR: 116 ML/MIN/1.73
EOSINOPHIL # BLD AUTO: 0.1 X10E3/UL (ref 0–0.4)
EOSINOPHIL NFR BLD AUTO: 2 %
EPI CELLS #/AREA URNS HPF: NORMAL /HPF (ref 0–10)
ERYTHROCYTE [DISTWIDTH] IN BLOOD BY AUTOMATED COUNT: 15.3 % (ref 11.6–15.4)
ERYTHROCYTE [SEDIMENTATION RATE] IN BLOOD BY WESTERGREN METHOD: 40 MM/HR (ref 0–15)
GLOBULIN SER-MCNC: 3.6 G/DL (ref 1.5–4.5)
GLUCOSE SERPL-MCNC: 86 MG/DL (ref 70–99)
GLUCOSE UR QL: NEGATIVE
HCT VFR BLD AUTO: 39.6 % (ref 37.5–51)
HGB BLD-MCNC: 13.4 G/DL (ref 13–17.7)
HGB UR QL STRIP: NEGATIVE
IMM GRANULOCYTES # BLD: 0.1 X10E3/UL (ref 0–0.1)
IMM GRANULOCYTES NFR BLD: 1 %
KETONES UR QL STRIP: NEGATIVE
LEUKOCYTE ESTERASE UR QL STRIP: NEGATIVE
LYMPHOCYTES # BLD AUTO: 1.6 X10E3/UL (ref 0.7–3.1)
LYMPHOCYTES NFR BLD AUTO: 23 %
MCH RBC QN AUTO: 26.4 PG (ref 26.6–33)
MCHC RBC AUTO-ENTMCNC: 33.8 G/DL (ref 31.5–35.7)
MCV RBC AUTO: 78 FL (ref 79–97)
MICRO URNS: NORMAL
MICRO URNS: NORMAL
MONOCYTES # BLD AUTO: 0.5 X10E3/UL (ref 0.1–0.9)
MONOCYTES NFR BLD AUTO: 7 %
NEUTROPHILS # BLD AUTO: 4.8 X10E3/UL (ref 1.4–7)
NEUTROPHILS NFR BLD AUTO: 67 %
NITRITE UR QL STRIP: NEGATIVE
PH UR STRIP: 6 [PH] (ref 5–7.5)
PLATELET # BLD AUTO: 265 X10E3/UL (ref 150–450)
POTASSIUM SERPL-SCNC: 4.7 MMOL/L (ref 3.5–5.2)
PROT SERPL-MCNC: 8 G/DL (ref 6–8.5)
PROT UR QL STRIP: NEGATIVE
PROT UR-MCNC: 12.5 MG/DL
PROT/CREAT UR: 96 MG/G CREAT (ref 0–200)
RBC # BLD AUTO: 5.07 X10E6/UL (ref 4.14–5.8)
RBC #/AREA URNS HPF: NORMAL /HPF (ref 0–2)
SODIUM SERPL-SCNC: 138 MMOL/L (ref 134–144)
SP GR UR: 1.02 (ref 1–1.03)
UROBILINOGEN UR STRIP-ACNC: 0.2 MG/DL (ref 0.2–1)
WBC # BLD AUTO: 7.1 X10E3/UL (ref 3.4–10.8)
WBC #/AREA URNS HPF: NORMAL /HPF (ref 0–5)

## 2023-09-21 ENCOUNTER — OFFICE VISIT (OUTPATIENT)
Dept: RHEUMATOLOGY | Facility: CLINIC | Age: 33
End: 2023-09-21
Payer: COMMERCIAL

## 2023-09-21 VITALS — DIASTOLIC BLOOD PRESSURE: 80 MMHG | SYSTOLIC BLOOD PRESSURE: 116 MMHG | HEART RATE: 112 BPM

## 2023-09-21 DIAGNOSIS — M32.9 SYSTEMIC LUPUS ERYTHEMATOSUS, UNSPECIFIED SLE TYPE, UNSPECIFIED ORGAN INVOLVEMENT STATUS (HCC): Primary | ICD-10-CM

## 2023-09-21 DIAGNOSIS — E55.9 VITAMIN D DEFICIENCY: ICD-10-CM

## 2023-09-21 DIAGNOSIS — E34.9 TESTOSTERONE INSUFFICIENCY: ICD-10-CM

## 2023-09-21 PROCEDURE — 99214 OFFICE O/P EST MOD 30 MIN: CPT | Performed by: INTERNAL MEDICINE

## 2023-09-21 RX ORDER — ERGOCALCIFEROL 1.25 MG/1
50000 CAPSULE ORAL WEEKLY
Qty: 8 CAPSULE | Refills: 0 | Status: SHIPPED | OUTPATIENT
Start: 2023-09-21

## 2023-09-21 NOTE — PROGRESS NOTES
Assessment and Plan:   Mr. Hale Case a 60-year-old male originally from [place] with history significant for systemic lupus erythematosus diagnosed in January 2023 [based on a positive NOEMY>1:1280 speckled pattern, elevated double-stranded DNA antibody of 84, anti-RNP/Rothman antibody greater than 8, elevated antihistone antibody, hypocomplementemia, elevated anticardiolipin antibodies, constitutional symptoms, inflammatory arthritis, lymphadenopathy and Raynaud's] who presents for a follow-up. He is currently not on DMARDs.     # Systemic lupus erythematosus  - Moriah Powell presents today for a follow-up of systemic lupus erythematosus and states as he has overall been doing great so he self discontinued the hydroxychloroquine approximately 2 to 3 months ago. He would prefer to stay off it unless he has a recurrence of symptoms as he would like to minimize medication use and is concerned about the rare possibility of retinal toxicity. I counseled him on the risks of remaining off the hydroxychloroquine as this also has a preventative effect. He is understanding of this and will let me know if any of his symptoms are to change. For management of the periodic hives he will utilize topical steroids or Benadryl as needed. I requested he continue to update lupus lab monitoring every 3 months.     # Vitamin D deficiency  - As he is still deficient I will represcribe vitamin D 50,000 international units once weekly for 8 weeks and following this requested he start vitamin D 2000 international units once daily over-the-counter. Plan:  Diagnoses and all orders for this visit:    Systemic lupus erythematosus, unspecified SLE type, unspecified organ involvement status ([address])  -     CBC and differential; Future  -     Comprehensive metabolic panel; Future  -     C-reactive protein; Future  -     Sedimentation rate, automated; Future  -     C4 complement; Future  -     C3 complement;  Future  -     Urinalysis with microscopic  -     Protein / creatinine ratio, urine  -     Anti-DNA antibody, double-stranded; Future  -     CBC and differential  -     Comprehensive metabolic panel  -     C-reactive protein  -     Sedimentation rate, automated  -     C4 complement  -     C3 complement  -     Anti-DNA antibody, double-stranded  -     CBC and differential; Future  -     Comprehensive metabolic panel; Future  -     C-reactive protein; Future  -     Sedimentation rate, automated; Future  -     C4 complement; Future  -     C3 complement; Future  -     Anti-DNA antibody, double-stranded; Future  -     Urinalysis with microscopic  -     Protein / creatinine ratio, urine  -     CBC and differential  -     Comprehensive metabolic panel  -     C-reactive protein  -     Sedimentation rate, automated  -     C4 complement  -     C3 complement  -     Anti-DNA antibody, double-stranded    Vitamin D deficiency  -     ergocalciferol (VITAMIN D2) 50,000 units; Take 1 capsule (50,000 Units total) by mouth once a week    Testosterone insufficiency  -     Testosterone; Future  -     Testosterone      Activities as tolerated. Exercise: try to maintain a low impact exercise regimen as much as possible. Walk for 30 minutes a day for at least 3 days a week. Continue other medications as prescribed by PCP and other specialists. RTC in 1 year.         HPI    INITIAL VISIT NOTE (1/2023):  Mr. Katelyn Gallegos a 26-year-old male originally from [place] with no significant past medical history who presents for an evaluation of a positive NOEMY >1:1280 speckled pattern and elevated double-stranded DNA antibody of 84 that were detected as a result of bilateral hand pain and swelling.  He is referred by Dr. Raza Yeung for a rheumatology consult.     Patient reports he was in his usual state of health up until the summer 2022 and reports for at least 15 years prior to this he had not had to see a physician.  At that time he developed abrupt onset of bilateral hand pain, swelling and morning stiffness where he was unable to make a fist.  He reports running his hands under hot water in the morning would help.  The prominent symptoms lasted through August and he reports following this the hand complaints spontaneously resolved and is currently 90% improved.  At times he has noticed pain also occurring in his wrists and very uncommonly in his knees but generally he is not experiencing concerning joint pain.  No involvement of his elbows, shoulders, hips, ankles or feet.  He reports gradually since last year he has been experiencing diffuse muscle pain affecting the entirety of his upper and lower extremities.  He reports with prolonged duration of being seated the symptoms worsen.  This has been a fairly constant symptom.  He did try naproxen prescribed by his primary care physician which was ineffective. Khalif Hernandez has not taken any over-the-counter pain medications.     He has been experiencing multiple additional complaints since last July also including fatigue which is worse towards the end of the day, night sweats which has been severe on at least 3-4 occasions where he has woken up drenched, swollen lymph nodes which he has appreciated in his neck on both the anterior and posterior aspects [which he thinks becomes more prominent when he has episodes of the night sweats], mild hair loss which was occurring if he ran his fingers through his hair and has overall improved as well as symptoms consistent with Raynaud's affecting his hands where on cold exposure it will initially turn white and with rewarming change to purple/blue and then red.  He reports from the summer till December 2022 he lost 15 pounds unintentionally but his current weight is stable at 197 pounds.     He denies fevers, dry eyes, dry mouth, inflammatory eye disease, recurrent skin rash [reports over the past few dwyer he has appreciated a pimple type of rash on the inner aspects of his bilateral thighs which resolves spontaneously], psoriasis, photosensitivity, mouth/nose ulcers, pleuritic chest pain, shortness of breath, cough, inflammatory bowel disease [reports a history of treated H. pylori infection 10 years ago], blood clots or a family history of autoimmune disease [although he is unaware of his paternal family history].    In view of these symptoms he was evaluated by his primary care physician in September 2022 and had blood work done which showed the positive NOEMY and double-stranded DNA antibody.  An ESR and CRP were elevated at 78 and 14, respectively.  A CBC, CMP, Sjogren's antibodies, Lyme antibody profile, HIV, rheumatoid factor, anti-CCP antibody, hepatitis C antibody and TSH were unremarkable.        1/27/2023:  Patient presents for a follow-up of systemic lupus erythematosus. He is currently on hydroxychloroquine 200 mg twice daily that was started at the initial office visit. I reviewed his blood work done after the visit which shows an elevated ESR of 110 with hypocomplementemia with a C3 and C4 decreased at 77 and 2, respectively. A double-stranded DNA antibody was elevated at 45. An anti-RNP and Rothman antibodies were greater than 8. An antihistone antibody was elevated at 6.9. An anticardiolipin IgG and IgM antibodies were elevated at 71 and 38, respectively. Vitamin D levels were low at 14.2. LDH was slightly elevated at 269. A CBC, CMP, C-reactive protein, urine analysis, urine protein creatinine ratio, remainder of the NOEMY specificity, beta-2 glycoprotein antibodies, lupus anticoagulant, CK, ferritin, SPEP and leukemia/lymphoma flow cytometry were unremarkable.     He is not reporting any new complaints and we scheduled a follow-up appointment to review his results. 5/11/2023:  Patient presents for a follow-up of systemic lupus erythematosus. He is currently on hydroxychloroquine that he takes as 200 mg once daily or every other day.   He completed a prednisone taper from 1/27 till 2/27 and has otherwise not been on steroids. I reviewed his labs done on 3/25 which showed a slightly elevated ESR of 28. A double-stranded DNA antibody was elevated at 13. A C4 was slightly low at 8. A vitamin D level was low at 15.5. A CBC, CMP, C-reactive protein, urine analysis, urine protein creatinine ratio, C3 and anticardiolipin antibodies were normal.  He was also seen by hematology/oncology and had a CT soft tissue neck done which showed symmetric bilateral cervical adenopathy. A CT chest/abdomen/pelvis showed mildly enlarged lymphadenopathy throughout the chest, abdomen and pelvis with the largest nodes in the axillary and supraclavicular regions measuring up to 13 mm in short axis and the largest retroperitoneal/pelvic/inguinal lymph nodes measuring up to 11 mm in short axis. Mild splenomegaly of 13.3 cm was noted. After his evaluation with hematology/oncology the lymphadenopathy was thought to be secondary to his underlying diagnosis of lupus and there were no concerns for malignancy. He does not require a scheduled follow-up appointment with them. He reports since completing the steroid course and starting hydroxychloroquine he has been doing well. He still notes some lymph nodes at the back of his neck and will experience joint pains occasionally. No significant or consistent complaints at this time. He is also describing symptoms of left heel pain which occurs in the morning or after being seated for a prolonged duration of time. This improves after taking the first few steps. Initially he was started on hydroxychloroquine at 200 mg twice daily which he took for the first few months. After he started noticing an improvement in his symptoms and due to concerns for retinal toxicity he decreased the hydroxychloroquine to 200 mg once daily or every other day.   At times he will notice a slight flareup in his symptoms but this improves after he takes the hydroxychloroquine. 9/21/2023:  Patient presents for a follow-up of systemic lupus erythematosus. He is currently not on hydroxychloroquine or steroids and states he discontinued the hydroxychloroquine approximately 2 to 3 months ago. I reviewed his recent labs which showed a slightly elevated double-stranded DNA antibody of 12. An ESR was elevated at 40. A vitamin D level was low at 17.8. A CBC, CMP, C-reactive protein, C3, C4, urine analysis and urine protein creatinine ratio were unremarkable. Patient reports he discontinued the hydroxychloroquine a few months ago as he has overall been doing well and not experiencing any concerning symptoms. He even traveled to [facility] and had sun exposure without any flareups noted. His only complaint is periodic hives that he will experience which will self resolve. No fevers, unintentional weight loss, alopecia, other types of skin rash, photosensitivity, mouth/nose ulcers, swollen glands, pleuritic chest pain or worsening joint pain/swelling/stiffness. The lymphadenopathy has resolved. The following portions of the patient's history were reviewed and updated as appropriate: allergies, current medications, past family history, past medical history, past social history, past surgical history and problem list.      Review of Systems  Constitutional: Negative for weight change, fevers, chills, night sweats, fatigue. ENT/Mouth: Negative for hearing changes, ear pain, nasal congestion, sinus pain, hoarseness, sore throat, rhinorrhea, swallowing difficulty. Eyes: Negative for pain, redness, discharge, vision changes. Cardiovascular: Negative for chest pain, SOB, palpitations. Respiratory: Negative for cough, sputum, wheezing, dyspnea. Gastrointestinal: Negative for nausea, vomiting, diarrhea, constipation, pain, heartburn. Genitourinary: Negative for dysuria, urinary frequency, hematuria. Musculoskeletal: As per HPI.   Skin: Negative for skin rash currently, color changes. Neuro: Negative for weakness, numbness, tingling, loss of consciousness. Psych: Negative for anxiety, depression. Heme/Lymph: Negative for easy bruising, bleeding. History reviewed. No pertinent past medical history. History reviewed. No pertinent surgical history. Social History     Socioeconomic History   • Marital status: /Civil Union     Spouse name: Not on file   • Number of children: Not on file   • Years of education: Not on file   • Highest education level: Not on file   Occupational History   • Not on file   Tobacco Use   • Smoking status: Never   • Smokeless tobacco: Never   Substance and Sexual Activity   • Alcohol use: Yes     Comment: social use only   • Drug use: Never   • Sexual activity: Yes   Other Topics Concern   • Not on file   Social History Narrative   • Not on file     Social Determinants of Health     Financial Resource Strain: Not on file   Food Insecurity: Not on file   Transportation Needs: Not on file   Physical Activity: Not on file   Stress: Not on file   Social Connections: Not on file   Intimate Partner Violence: Not on file   Housing Stability: Not on file       History reviewed. No pertinent family history. Allergies   Allergen Reactions   • Cat Hair Extract Sneezing   • Other Sneezing     Environmental  allergies       Current Outpatient Medications:   •  ergocalciferol (VITAMIN D2) 50,000 units, Take 1 capsule (50,000 Units total) by mouth once a week, Disp: 8 capsule, Rfl: 0      Objective:    Vitals:    09/21/23 1521   BP: 116/80   Pulse: (!) 112       Physical Exam  General: Well appearing, well nourished, in no distress. Oriented x 3, normal mood and affect. Ambulating without difficulty. Skin: Good turgor, no rash, unusual bruising or prominent lesions. Hair: Normal texture and distribution. Nails: Normal color, no deformities. HEENT:  Head: Normocephalic, atraumatic.   Eyes: Conjunctiva clear, sclera non-icteric, EOM intact. Extremities: No amputations or deformities, cyanosis, edema. Neurologic: Alert and oriented. No focal neurological deficits appreciated. Psychiatric: Normal mood and affect. Sha Resendiz M.D.   Rheumatology

## 2023-11-13 DIAGNOSIS — E55.9 VITAMIN D DEFICIENCY: ICD-10-CM

## 2023-11-14 RX ORDER — ERGOCALCIFEROL 1.25 MG/1
50000 CAPSULE ORAL WEEKLY
Qty: 8 CAPSULE | Refills: 0 | Status: SHIPPED | OUTPATIENT
Start: 2023-11-14

## 2024-02-28 ENCOUNTER — PATIENT MESSAGE (OUTPATIENT)
Dept: FAMILY MEDICINE CLINIC | Facility: CLINIC | Age: 34
End: 2024-02-28

## 2024-02-28 DIAGNOSIS — E78.1 HYPERTRIGLYCERIDEMIA: Primary | ICD-10-CM

## 2024-02-28 DIAGNOSIS — Z13.29 SCREENING FOR THYROID DISORDER: ICD-10-CM

## 2024-02-28 DIAGNOSIS — R10.9 ABDOMINAL DISCOMFORT: Primary | ICD-10-CM

## 2024-02-28 DIAGNOSIS — R10.9 ABDOMINAL DISCOMFORT: ICD-10-CM

## 2024-04-28 LAB
CHOLEST SERPL-MCNC: 168 MG/DL (ref 100–199)
HDLC SERPL-MCNC: 18 MG/DL
LDLC SERPL CALC-MCNC: 42 MG/DL (ref 0–99)
LDLC/HDLC SERPL: 2.3 RATIO (ref 0–3.6)
MICRODELETION SYND BLD/T FISH: NORMAL
SL AMB VLDL CHOLESTEROL CALC: 108 MG/DL (ref 5–40)
TRIGL SERPL-MCNC: 776 MG/DL (ref 0–149)
TSH SERPL DL<=0.005 MIU/L-ACNC: 2.34 UIU/ML (ref 0.45–4.5)

## 2024-04-29 LAB
ALBUMIN SERPL-MCNC: 4.2 G/DL (ref 4.1–5.1)
ALBUMIN/GLOB SERPL: 1 {RATIO} (ref 1.2–2.2)
ALP SERPL-CCNC: 87 IU/L (ref 44–121)
ALT SERPL-CCNC: 24 IU/L (ref 0–44)
APPEARANCE UR: CLEAR
AST SERPL-CCNC: 25 IU/L (ref 0–40)
BACTERIA URNS QL MICRO: NORMAL
BASOPHILS # BLD AUTO: 0 X10E3/UL (ref 0–0.2)
BASOPHILS NFR BLD AUTO: 0 %
BILIRUB SERPL-MCNC: 0.3 MG/DL (ref 0–1.2)
BILIRUB UR QL STRIP: NEGATIVE
BUN SERPL-MCNC: 13 MG/DL (ref 6–20)
BUN/CREAT SERPL: 13 (ref 9–20)
C3 SERPL-MCNC: 91 MG/DL (ref 82–167)
C4 SERPL-MCNC: 9 MG/DL (ref 12–38)
CALCIUM SERPL-MCNC: 9.2 MG/DL (ref 8.7–10.2)
CASTS URNS QL MICRO: NORMAL /LPF
CHLORIDE SERPL-SCNC: 98 MMOL/L (ref 96–106)
CO2 SERPL-SCNC: 21 MMOL/L (ref 20–29)
COLOR UR: YELLOW
CREAT SERPL-MCNC: 1.03 MG/DL (ref 0.76–1.27)
CREAT UR-MCNC: 304.1 MG/DL
CRP SERPL-MCNC: 9 MG/L (ref 0–10)
DSDNA AB SER-ACNC: 191 IU/ML (ref 0–9)
EGFR: 98 ML/MIN/1.73
EOSINOPHIL # BLD AUTO: 0.1 X10E3/UL (ref 0–0.4)
EOSINOPHIL NFR BLD AUTO: 2 %
EPI CELLS #/AREA URNS HPF: NORMAL /HPF (ref 0–10)
ERYTHROCYTE [DISTWIDTH] IN BLOOD BY AUTOMATED COUNT: 14.5 % (ref 11.6–15.4)
ERYTHROCYTE [SEDIMENTATION RATE] IN BLOOD BY WESTERGREN METHOD: 31 MM/HR (ref 0–15)
GLOBULIN SER-MCNC: 4.3 G/DL (ref 1.5–4.5)
GLUCOSE SERPL-MCNC: 92 MG/DL (ref 70–99)
GLUCOSE UR QL: NEGATIVE
HCT VFR BLD AUTO: 38.5 % (ref 37.5–51)
HGB BLD-MCNC: 12.7 G/DL (ref 13–17.7)
HGB UR QL STRIP: NEGATIVE
IMM GRANULOCYTES # BLD: 0.1 X10E3/UL (ref 0–0.1)
IMM GRANULOCYTES NFR BLD: 1 %
KETONES UR QL STRIP: NEGATIVE
LEUKOCYTE ESTERASE UR QL STRIP: NEGATIVE
LYMPHOCYTES # BLD AUTO: 0.9 X10E3/UL (ref 0.7–3.1)
LYMPHOCYTES NFR BLD AUTO: 19 %
MCH RBC QN AUTO: 26.5 PG (ref 26.6–33)
MCHC RBC AUTO-ENTMCNC: 33 G/DL (ref 31.5–35.7)
MCV RBC AUTO: 80 FL (ref 79–97)
MICRO URNS: ABNORMAL
MONOCYTES # BLD AUTO: 0.3 X10E3/UL (ref 0.1–0.9)
MONOCYTES NFR BLD AUTO: 6 %
NEUTROPHILS # BLD AUTO: 3.3 X10E3/UL (ref 1.4–7)
NEUTROPHILS NFR BLD AUTO: 72 %
NITRITE UR QL STRIP: NEGATIVE
PH UR STRIP: 6 [PH] (ref 5–7.5)
PLATELET # BLD AUTO: 268 X10E3/UL (ref 150–450)
POTASSIUM SERPL-SCNC: 4.2 MMOL/L (ref 3.5–5.2)
PROT SERPL-MCNC: 8.5 G/DL (ref 6–8.5)
PROT UR QL STRIP: ABNORMAL
PROT UR-MCNC: 42.4 MG/DL
PROT/CREAT UR: 139 MG/G CREAT (ref 0–200)
RBC # BLD AUTO: 4.79 X10E6/UL (ref 4.14–5.8)
RBC #/AREA URNS HPF: NORMAL /HPF (ref 0–2)
SODIUM SERPL-SCNC: 135 MMOL/L (ref 134–144)
SP GR UR: >=1.03 (ref 1–1.03)
UREA BREATH TEST QL: NEGATIVE
UROBILINOGEN UR STRIP-ACNC: 0.2 MG/DL (ref 0.2–1)
WBC # BLD AUTO: 4.6 X10E3/UL (ref 3.4–10.8)
WBC #/AREA URNS HPF: NORMAL /HPF (ref 0–5)

## 2024-04-30 ENCOUNTER — TELEPHONE (OUTPATIENT)
Dept: RHEUMATOLOGY | Facility: CLINIC | Age: 34
End: 2024-04-30

## 2024-04-30 NOTE — TELEPHONE ENCOUNTER
Left message for patient relaying info from Dr. Jacobson. Left call back number if patient would like to be seen sooner.

## 2024-04-30 NOTE — TELEPHONE ENCOUNTER
----- Message from Patrica Jacobson MD sent at 4/29/2024  2:10 PM EDT -----  Please check how he is feeling, his lupus labs are abnormal again. Does he want to see me sooner than Sept?

## 2024-05-14 ENCOUNTER — OFFICE VISIT (OUTPATIENT)
Dept: FAMILY MEDICINE CLINIC | Facility: CLINIC | Age: 34
End: 2024-05-14
Payer: COMMERCIAL

## 2024-05-14 VITALS
HEART RATE: 98 BPM | DIASTOLIC BLOOD PRESSURE: 80 MMHG | BODY MASS INDEX: 33.34 KG/M2 | TEMPERATURE: 97.3 F | HEIGHT: 68 IN | SYSTOLIC BLOOD PRESSURE: 126 MMHG | RESPIRATION RATE: 16 BRPM | WEIGHT: 220 LBS

## 2024-05-14 DIAGNOSIS — M32.9 SYSTEMIC LUPUS ERYTHEMATOSUS, UNSPECIFIED SLE TYPE, UNSPECIFIED ORGAN INVOLVEMENT STATUS (HCC): Primary | ICD-10-CM

## 2024-05-14 DIAGNOSIS — E78.1 HYPERTRIGLYCERIDEMIA: ICD-10-CM

## 2024-05-14 PROCEDURE — 99214 OFFICE O/P EST MOD 30 MIN: CPT | Performed by: FAMILY MEDICINE

## 2024-05-14 NOTE — PROGRESS NOTES
"Name: Willy Penaloza      : 1990      MRN: [id number]  Encounter Provider: Alva Real MD  Encounter Date: 2024   Encounter department: [facility]    Assessment & Plan     1. Systemic lupus erythematosus, unspecified SLE type, unspecified organ involvement status (HCC)  Assessment & Plan:  Stable on no medications. Follows rheumatologist Dr. Jacobson.       2. Hypertriglyceridemia  Assessment & Plan:  Lab Results   Component Value Date    CHOLESTEROL 168 2024    CHOLESTEROL 134 2022     Lab Results   Component Value Date    HDL 18 (L) 2024    HDL 23 (L) 2022     Lab Results   Component Value Date    TRIG 776 (HH) 2024    TRIG 390 (H) 2022     No results found for: \"NONHDLC\"     Significant elevation noted in his trig level. He previously had a poor diet and did not exercise. He has been working on this and started taking omega 3 fish oil daily.   We did discuss starting statin vs something like gemfibrozil, but he would like to try lifestyle changes first and repeat labs in 3 months.   Aware of risk of pancreatitis.   Follow up in 3 months.     Orders:  -     Comprehensive metabolic panel; Future; Expected date: 2024  -     Lipid Panel with Direct LDL reflex; Future; Expected date: 2024  -     Comprehensive metabolic panel  -     Lipid Panel with Direct LDL reflex           Subjective      HPI  Willy is here today for routine f/u visit and to discuss his recent blood work which showed high triglycerides. He has had this issue in the past as well, but since then has been eating poorly and not exercising at all. He has gained weight.   Review of Systems   Constitutional: Negative.    HENT: Negative.     Eyes: Negative.    Respiratory: Negative.     Cardiovascular: Negative.    Gastrointestinal: Negative.    Endocrine: Negative.    Genitourinary: Negative.    Musculoskeletal: Negative.    Neurological: Negative.    Hematological: Negative.  " "  Psychiatric/Behavioral: Negative.         Current Outpatient Medications on File Prior to Visit   Medication Sig    [DISCONTINUED] ergocalciferol (VITAMIN D2) 50,000 units TAKE 1 CAPSULE BY MOUTH 1 TIME A WEEK (Patient not taking: Reported on 5/14/2024)       Objective     /80   Pulse 98   Temp (!) 97.3 °F (36.3 °C) (Temporal)   Resp 16   Ht 5' 8\" (1.727 m)   Wt 99.8 kg (220 lb)   BMI 33.45 kg/m²     Physical Exam  Constitutional:       General: He is not in acute distress.     Appearance: He is well-developed. He is not diaphoretic.   HENT:      Head: Normocephalic and atraumatic.   Eyes:      General: No scleral icterus.        Right eye: No discharge.         Left eye: No discharge.      Conjunctiva/sclera: Conjunctivae normal.   Cardiovascular:      Rate and Rhythm: Normal rate and regular rhythm.      Pulses: Normal pulses.   Pulmonary:      Effort: Pulmonary effort is normal. No respiratory distress.      Breath sounds: Normal breath sounds. No stridor. No wheezing, rhonchi or rales.   Chest:      Chest wall: No tenderness.   Musculoskeletal:         General: Normal range of motion.      Cervical back: Normal range of motion.   Skin:     General: Skin is warm.   Neurological:      Mental Status: He is alert and oriented to person, place, and time.   Psychiatric:         Mood and Affect: Mood normal.         Behavior: Behavior normal.         Thought Content: Thought content normal.         Judgment: Judgment normal.       Alva Real MD    "

## 2024-05-14 NOTE — ASSESSMENT & PLAN NOTE
"Lab Results   Component Value Date    CHOLESTEROL 168 04/27/2024    CHOLESTEROL 134 09/03/2022     Lab Results   Component Value Date    HDL 18 (L) 04/27/2024    HDL 23 (L) 09/03/2022     Lab Results   Component Value Date    TRIG 776 (HH) 04/27/2024    TRIG 390 (H) 09/03/2022     No results found for: \"NONHDLC\"     Significant elevation noted in his trig level. He previously had a poor diet and did not exercise. He has been working on this and started taking omega 3 fish oil daily.   We did discuss starting statin vs something like gemfibrozil, but he would like to try lifestyle changes first and repeat labs in 3 months.   Aware of risk of pancreatitis.   Follow up in 3 months.   "

## 2024-06-05 ENCOUNTER — TELEMEDICINE (OUTPATIENT)
Dept: FAMILY MEDICINE CLINIC | Facility: CLINIC | Age: 34
End: 2024-06-05
Payer: COMMERCIAL

## 2024-06-05 DIAGNOSIS — L23.7 ALLERGIC CONTACT DERMATITIS DUE TO PLANTS, EXCEPT FOOD: Primary | ICD-10-CM

## 2024-06-05 PROCEDURE — 99213 OFFICE O/P EST LOW 20 MIN: CPT | Performed by: STUDENT IN AN ORGANIZED HEALTH CARE EDUCATION/TRAINING PROGRAM

## 2024-06-05 RX ORDER — PREDNISONE 10 MG/1
TABLET ORAL
Qty: 27 TABLET | Refills: 0 | Status: SHIPPED | OUTPATIENT
Start: 2024-06-05 | End: 2024-06-15

## 2024-06-05 NOTE — PROGRESS NOTES
Virtual Brief Visit    This Visit is being completed by telephone. The Patient is located at Home and in the following state in which I hold an active license [place]    The patient was identified by name and date of birth. Willy Penaloza was informed that this is a telemedicine visit and that the visit is being conducted through the [employer] platform. He agrees to proceed..  My office door was closed. No one else was in the room.  He acknowledged consent and understanding of privacy and security of the video platform. The patient has agreed to participate and understands they can discontinue the visit at any time.    Patient is aware this is a billable service.       Assessment/Plan:    Problem List Items Addressed This Visit    None  Visit Diagnoses     Allergic contact dermatitis due to plants, except food    -  Primary    Relevant Medications    predniSONE 10 mg tablet        -Complete steroid taper  -Apply calamine lotion to affected areas  -Follow up with PCP if rash does not improve      Patient reports about two week ago he was pulling weeds and poison sumac and notes he developed a pruritic vesicular rash all over his arms and abdomen. He has tried OTC lotions including calamine lotion and zinc oxide which is not helping the rash. He does have a history of lupus.  Denies trouble breathing, fever and chills.       Recent Visits  No visits were found meeting these conditions.  Showing recent visits within past 7 days and meeting all other requirements  Today's Visits  Date Type Provider Dept   06/05/24 Telemedicine Isadora Ramos MD [facility]   Showing today's visits and meeting all other requirements  Future Appointments  No visits were found meeting these conditions.  Showing future appointments within next 150 days and meeting all other requirements         Visit Time  Total Visit Duration: 20

## 2024-09-14 LAB
ALBUMIN SERPL-MCNC: 3.2 G/DL (ref 4.1–5.1)
ALP SERPL-CCNC: 72 IU/L (ref 44–121)
ALT SERPL-CCNC: 16 IU/L (ref 0–44)
APPEARANCE UR: ABNORMAL
AST SERPL-CCNC: 25 IU/L (ref 0–40)
BACTERIA URNS QL MICRO: ABNORMAL
BASOPHILS # BLD AUTO: 0 X10E3/UL (ref 0–0.2)
BASOPHILS NFR BLD AUTO: 0 %
BILIRUB SERPL-MCNC: <0.2 MG/DL (ref 0–1.2)
BILIRUB UR QL STRIP: NEGATIVE
BUN SERPL-MCNC: 18 MG/DL (ref 6–20)
BUN/CREAT SERPL: 20 (ref 9–20)
C3 SERPL-MCNC: 84 MG/DL (ref 82–167)
C4 SERPL-MCNC: 5 MG/DL (ref 12–38)
CALCIUM SERPL-MCNC: 8.7 MG/DL (ref 8.7–10.2)
CASTS URNS QL MICRO: ABNORMAL /LPF
CHLORIDE SERPL-SCNC: 104 MMOL/L (ref 96–106)
CO2 SERPL-SCNC: 19 MMOL/L (ref 20–29)
COLOR UR: YELLOW
CREAT SERPL-MCNC: 0.9 MG/DL (ref 0.76–1.27)
CREAT UR-MCNC: 107.5 MG/DL
CRP SERPL-MCNC: 5 MG/L (ref 0–10)
DSDNA AB SER-ACNC: 43 IU/ML (ref 0–9)
EGFR: 115 ML/MIN/1.73
EOSINOPHIL # BLD AUTO: 0.1 X10E3/UL (ref 0–0.4)
EOSINOPHIL NFR BLD AUTO: 2 %
EPI CELLS #/AREA URNS HPF: ABNORMAL /HPF (ref 0–10)
ERYTHROCYTE [DISTWIDTH] IN BLOOD BY AUTOMATED COUNT: 14.9 % (ref 11.6–15.4)
ERYTHROCYTE [SEDIMENTATION RATE] IN BLOOD BY WESTERGREN METHOD: 12 MM/HR (ref 0–15)
GLOBULIN SER-MCNC: 4 G/DL (ref 1.5–4.5)
GLUCOSE SERPL-MCNC: 94 MG/DL (ref 70–99)
GLUCOSE UR QL: NEGATIVE
HCT VFR BLD AUTO: 36.3 % (ref 37.5–51)
HGB BLD-MCNC: 12.6 G/DL (ref 13–17.7)
HGB UR QL STRIP: ABNORMAL
IMM GRANULOCYTES # BLD: 0.1 X10E3/UL (ref 0–0.1)
IMM GRANULOCYTES NFR BLD: 1 %
KETONES UR QL STRIP: NEGATIVE
LEUKOCYTE ESTERASE UR QL STRIP: NEGATIVE
LYMPHOCYTES # BLD AUTO: 1.9 X10E3/UL (ref 0.7–3.1)
LYMPHOCYTES NFR BLD AUTO: 26 %
MCH RBC QN AUTO: 27.9 PG (ref 26.6–33)
MCHC RBC AUTO-ENTMCNC: 34.7 G/DL (ref 31.5–35.7)
MCV RBC AUTO: 81 FL (ref 79–97)
MICRO URNS: ABNORMAL
MONOCYTES # BLD AUTO: 0.5 X10E3/UL (ref 0.1–0.9)
MONOCYTES NFR BLD AUTO: 7 %
MORPHOLOGY BLD-IMP: ABNORMAL
NEUTROPHILS # BLD AUTO: 4.6 X10E3/UL (ref 1.4–7)
NEUTROPHILS NFR BLD AUTO: 64 %
NITRITE UR QL STRIP: NEGATIVE
PH UR STRIP: 6.5 [PH] (ref 5–7.5)
PLATELET # BLD AUTO: 244 X10E3/UL (ref 150–450)
POTASSIUM SERPL-SCNC: 4.5 MMOL/L (ref 3.5–5.2)
PROT SERPL-MCNC: 7.2 G/DL (ref 6–8.5)
PROT UR QL STRIP: ABNORMAL
PROT UR-MCNC: 325 MG/DL
PROT/CREAT UR: 3023 MG/G CREAT (ref 0–200)
RBC # BLD AUTO: 4.51 X10E6/UL (ref 4.14–5.8)
RBC #/AREA URNS HPF: >30 /HPF (ref 0–2)
SODIUM SERPL-SCNC: 139 MMOL/L (ref 134–144)
SP GR UR: 1.02 (ref 1–1.03)
TESTOST SERPL-MCNC: 243 NG/DL (ref 264–916)
UROBILINOGEN UR STRIP-ACNC: 0.2 MG/DL (ref 0.2–1)
WBC # BLD AUTO: 7.3 X10E3/UL (ref 3.4–10.8)
WBC #/AREA URNS HPF: ABNORMAL /HPF (ref 0–5)

## 2024-09-15 ENCOUNTER — TELEPHONE (OUTPATIENT)
Dept: RHEUMATOLOGY | Facility: CLINIC | Age: 34
End: 2024-09-15

## 2024-09-15 DIAGNOSIS — M32.19 OTHER SYSTEMIC LUPUS ERYTHEMATOSUS WITH OTHER ORGAN INVOLVEMENT (HCC): ICD-10-CM

## 2024-09-15 DIAGNOSIS — M32.14 LUPUS NEPHRITIS (HCC): Primary | ICD-10-CM

## 2024-09-15 DIAGNOSIS — R80.9 PROTEINURIA, UNSPECIFIED TYPE: ICD-10-CM

## 2024-09-15 NOTE — TELEPHONE ENCOUNTER
Please disregard previous message on low testosterone, can address at upcoming office visit.    Will review results at follow up but please let him know labs are concerning for lupus activity and lupus affecting the kidneys. Would recommend starting prednisone 60 mg once daily and referral to nephrology for consideration of kidney biopsy - we will also review at follow up visit but would like to get things started.     Will place nephrology referral and please check if he is okay to start the steroids.

## 2024-09-16 NOTE — TELEPHONE ENCOUNTER
LM for pt that Dr. Jacobson will go over results more in detail at upcoming appt.    She wanted to inform him as well that labs are concerning for lupus activity and lupus affecting kidneys. She placed a referral to nephrology and would like pt to start Prednisone 60mg once daily. Again she will review these at upcoming appt on 9/26 but would like to start if possible.       Asked pt to CB to go over information again and answer any questions he may have

## 2024-09-26 ENCOUNTER — OFFICE VISIT (OUTPATIENT)
Dept: RHEUMATOLOGY | Facility: CLINIC | Age: 34
End: 2024-09-26
Payer: COMMERCIAL

## 2024-09-26 VITALS
HEART RATE: 97 BPM | HEIGHT: 68 IN | BODY MASS INDEX: 33.92 KG/M2 | DIASTOLIC BLOOD PRESSURE: 80 MMHG | SYSTOLIC BLOOD PRESSURE: 134 MMHG | WEIGHT: 223.8 LBS | OXYGEN SATURATION: 100 %

## 2024-09-26 DIAGNOSIS — M32.9 SYSTEMIC LUPUS ERYTHEMATOSUS, UNSPECIFIED SLE TYPE, UNSPECIFIED ORGAN INVOLVEMENT STATUS (HCC): Primary | ICD-10-CM

## 2024-09-26 DIAGNOSIS — E34.9 TESTOSTERONE INSUFFICIENCY: ICD-10-CM

## 2024-09-26 DIAGNOSIS — R80.9 PROTEINURIA, UNSPECIFIED TYPE: ICD-10-CM

## 2024-09-26 DIAGNOSIS — Z79.899 LONG-TERM USE OF PLAQUENIL: ICD-10-CM

## 2024-09-26 DIAGNOSIS — Z11.59 NEED FOR HEPATITIS C SCREENING TEST: ICD-10-CM

## 2024-09-26 DIAGNOSIS — Z11.1 SCREENING-PULMONARY TB: ICD-10-CM

## 2024-09-26 DIAGNOSIS — M32.14 LUPUS NEPHRITIS (HCC): ICD-10-CM

## 2024-09-26 DIAGNOSIS — Z79.624 ON MYCOPHENOLATE MOFETIL THERAPY: ICD-10-CM

## 2024-09-26 DIAGNOSIS — Z79.52 CURRENT CHRONIC USE OF SYSTEMIC STEROIDS: ICD-10-CM

## 2024-09-26 DIAGNOSIS — Z79.899 ON MYCOPHENOLATE MOFETIL THERAPY: ICD-10-CM

## 2024-09-26 DIAGNOSIS — E55.9 VITAMIN D DEFICIENCY: ICD-10-CM

## 2024-09-26 PROCEDURE — 99215 OFFICE O/P EST HI 40 MIN: CPT | Performed by: INTERNAL MEDICINE

## 2024-09-26 RX ORDER — PREDNISONE 10 MG/1
60 TABLET ORAL DAILY
Qty: 180 TABLET | Refills: 2 | Status: SHIPPED | OUTPATIENT
Start: 2024-09-26

## 2024-09-26 RX ORDER — SULFAMETHOXAZOLE/TRIMETHOPRIM 800-160 MG
1 TABLET ORAL 3 TIMES WEEKLY
Qty: 12 TABLET | Refills: 2 | Status: SHIPPED | OUTPATIENT
Start: 2024-09-27 | End: 2024-12-26

## 2024-09-26 RX ORDER — HYDROXYCHLOROQUINE SULFATE 200 MG/1
200 TABLET, FILM COATED ORAL 2 TIMES DAILY WITH MEALS
Qty: 180 TABLET | Refills: 1 | Status: SHIPPED | OUTPATIENT
Start: 2024-09-26 | End: 2025-03-25

## 2024-09-26 RX ORDER — B-COMPLEX WITH VITAMIN C
1 TABLET ORAL 2 TIMES DAILY WITH MEALS
Qty: 180 TABLET | Refills: 1 | Status: SHIPPED | OUTPATIENT
Start: 2024-09-26

## 2024-09-26 RX ORDER — MYCOPHENOLATE MOFETIL 500 MG/1
TABLET ORAL
Qty: 360 TABLET | Refills: 1 | Status: SHIPPED | OUTPATIENT
Start: 2024-09-26

## 2024-09-26 NOTE — PATIENT INSTRUCTIONS
If you are taking the omeprazole (Prilosec) then you need to space this out from the Cell[employer] by atleast 4 hours.

## 2024-09-26 NOTE — PROGRESS NOTES
"Ambulatory Visit  Name: Willy Penaloza      : 1990      MRN: [id number]  Encounter Provider: Patrica Jacobson MD  Encounter Date: 2024   Encounter department: [facility] [place]    Assessment & Plan  Systemic lupus erythematosus, unspecified SLE type, unspecified organ involvement status (HCC)  Pt expressing no active complains today however he mentioned that he noticed frothy urine  Diagnosed in  when he had sudden onset of hands swelling and stiffness which progressed to whole body pain along with weight loss, hair loss  NOEMY+, DNA ab+, RNP+, zheng ab +, anti histone ab+  Pt was started on HCQ however he self discontinued (around 2023) as he \"was feeling well\" despite explanation of preventive nature of treatment  Recent UPC was significant for protein >3 g with concern for lupus nephritis  Plan:  Repeat UA  Proceed with blood work, as below  Nephrology referral provided  Start prednisone 60 mg daily  Start  mg BID for 1 week, increase to 1000 mg BID on second week, follow with us for further instructions  Restart taking HCQ twice daily  Start Bactrim three times a week  Start Prilosec daily, keep 4 h apart from antiacid and immunosuppressant intake  Orders:    CBC and differential; Future    Comprehensive metabolic panel; Future    C-reactive protein; Future    Sedimentation rate, automated; Future    C4 complement; Future    C3 complement; Future    Anti-DNA antibody, double-stranded; Future    Urinalysis with microscopic; Future    Protein / creatinine ratio, urine; Future    Ambulatory Referral to Nephrology; Future    CBC and differential    Comprehensive metabolic panel    C-reactive protein    Sedimentation rate, automated    C4 complement    C3 complement    Anti-DNA antibody, double-stranded    Urinalysis with microscopic    Protein / creatinine ratio, urine    hydroxychloroquine (PLAQUENIL) 200 mg tablet; Take 1 tablet (200 mg total) by mouth 2 (two) " times a day with meals    predniSONE 10 mg tablet; Take 6 tablets (60 mg total) by mouth daily    mycophenolate (CELLCEPT) 500 mg tablet; Take 1 tab twice daily x 1 week, then increase to 2 tabs twice daily    Lupus nephritis (HCC)    Orders:    Ambulatory Referral to Nephrology; Future    Urinalysis with microscopic; Future    Protein / creatinine ratio, urine; Future    Urinalysis with microscopic    Protein / creatinine ratio, urine    predniSONE 10 mg tablet; Take 6 tablets (60 mg total) by mouth daily    mycophenolate (CELLCEPT) 500 mg tablet; Take 1 tab twice daily x 1 week, then increase to 2 tabs twice daily    Proteinuria, unspecified type    Orders:    Ambulatory Referral to Nephrology; Future    Urinalysis with microscopic; Future    Protein / creatinine ratio, urine; Future    Urinalysis with microscopic    Protein / creatinine ratio, urine    Current chronic use of systemic steroids    Orders:    calcium carbonate-vitamin D 500 mg-5 mcg per tablet; Take 1 tablet by mouth 2 (two) times a day with meals    sulfamethoxazole-trimethoprim (BACTRIM DS) 800-160 mg per tablet; Take 1 tablet by mouth 3 (three) times a week    omeprazole (PriLOSEC) 20 mg delayed release capsule; Take 1 capsule (20 mg total) by mouth daily    Vitamin D deficiency    Orders:    Vitamin D 25 hydroxy; Future    Vitamin D 25 hydroxy    Need for hepatitis C screening test    Orders:    Viral Hepatitis Screening and Diagnosis (HBV, HCV); Future    Viral Hepatitis Screening and Diagnosis (HBV, HCV)    Screening-pulmonary TB    Orders:    QuantiFERON-TB Gold Plus [facility]; Future    QuantiFERON-TB Gold Plus [facility]    Long-term use of Plaquenil         On mycophenolate mofetil therapy         Testosterone insufficiency           History of Present Illness     Willy Penaloza is a 34 y.o. male who presents due to abnormal urine test suggestive of ongoing lupus nephritis.    Today pt is feeling well, denies any joints problem, rash or  mucosal ulcers, low grade fever, weight loss, alopecia, dry eyes or mouth. Pt reports flare (hands stiffness) for 2-3 times for 3 days improving with no interventions. Pt admits seasonal Raynaud's. Risk and benefits of current condition treatment discussed in details. Pt were able to ask questions and agreed to proceed. Pt somewhat hesitant to start treatment so repeat urine/UPC were ordered.        Summary of Rheumatologic disease:    On initial encounter Mr. Penaloza is a 32-year-old male originally from [facility] with no significant past medical history who presents for an evaluation of a positive NOEMY >1:1280 speckled pattern and elevated double-stranded DNA antibody of 84 that were detected as a result of bilateral hand pain and swelling.  He is referred by Dr. Real for a rheumatology consult.     Patient reports he was in his usual state of health up until the summer 2022 and reports for at least 15 years prior to this he had not had to see a physician.  At that time he developed abrupt onset of bilateral hand pain, swelling and morning stiffness where he was unable to make a fist.  He reports running his hands under hot water in the morning would help.  The prominent symptoms lasted through August and he reports following this the hand complaints spontaneously resolved and is currently 90% improved.  At times he has noticed pain also occurring in his wrists and very uncommonly in his knees but generally he is not experiencing concerning joint pain.  No involvement of his elbows, shoulders, hips, ankles or feet.  He reports gradually since last year he has been experiencing diffuse muscle pain affecting the entirety of his upper and lower extremities.  He reports with prolonged duration of being seated the symptoms worsen.  This has been a fairly constant symptom.  He did try naproxen prescribed by his primary care physician which was ineffective.  He has not taken any over-the-counter pain medications.     He  has been experiencing multiple additional complaints since last July also including fatigue which is worse towards the end of the day, night sweats which has been severe on at least 3-4 occasions where he has woken up drenched, swollen lymph nodes which he has appreciated in his neck on both the anterior and posterior aspects [which he thinks becomes more prominent when he has episodes of the night sweats], mild hair loss which was occurring if he ran his fingers through his hair and has overall improved as well as symptoms consistent with Raynaud's affecting his hands where on cold exposure it will initially turn white and with rewarming change to purple/blue and then red.  He reports from the summer till December 2022 he lost 15 pounds unintentionally but his current weight is stable at 197 pounds.     He denies fevers, dry eyes, dry mouth, inflammatory eye disease, recurrent skin rash [reports over the past few dwyer he has appreciated a pimple type of rash on the inner aspects of his bilateral thighs which resolves spontaneously], psoriasis, photosensitivity, mouth/nose ulcers, pleuritic chest pain, shortness of breath, cough, inflammatory bowel disease [reports a history of treated H. pylori infection 10 years ago], blood clots or a family history of autoimmune disease [although he is unaware of his paternal family history].     In view of these symptoms he was evaluated by his primary care physician in September 2022 and had blood work done which showed the positive NOEMY and double-stranded DNA antibody.  An ESR and CRP were elevated at 78 and 14, respectively.  A CBC, CMP, Sjogren's antibodies, Lyme antibody profile, HIV, rheumatoid factor, anti-CCP antibody, hepatitis C antibody and TSH were unremarkable.        1/27/2023:  Patient presents for a follow-up of systemic lupus erythematosus.  He is currently on hydroxychloroquine 200 mg twice daily that was started at the initial office visit.  I reviewed his  blood work done after the visit which shows an elevated ESR of 110 with hypocomplementemia with a C3 and C4 decreased at 77 and 2, respectively.  A double-stranded DNA antibody was elevated at 45.  An anti-RNP and Rothman antibodies were greater than 8.  An antihistone antibody was elevated at 6.9.  An anticardiolipin IgG and IgM antibodies were elevated at 71 and 38, respectively.  Vitamin D levels were low at 14.2.  LDH was slightly elevated at 269.  A CBC, CMP, C-reactive protein, urine analysis, urine protein creatinine ratio, remainder of the NOEMY specificity, beta-2 glycoprotein antibodies, lupus anticoagulant, CK, ferritin, SPEP and leukemia/lymphoma flow cytometry were unremarkable.     He is not reporting any new complaints and we scheduled a follow-up appointment to review his results.        5/11/2023:  Patient presents for a follow-up of systemic lupus erythematosus.  He is currently on hydroxychloroquine that he takes as 200 mg once daily or every other day.  He completed a prednisone taper from 1/27 till 2/27 and has otherwise not been on steroids.     I reviewed his labs done on 3/25 which showed a slightly elevated ESR of 28.  A double-stranded DNA antibody was elevated at 13.  A C4 was slightly low at 8.  A vitamin D level was low at 15.5.  A CBC, CMP, C-reactive protein, urine analysis, urine protein creatinine ratio, C3 and anticardiolipin antibodies were normal.  He was also seen by hematology/oncology and had a CT soft tissue neck done which showed symmetric bilateral cervical adenopathy.  A CT chest/abdomen/pelvis showed mildly enlarged lymphadenopathy throughout the chest, abdomen and pelvis with the largest nodes in the axillary and supraclavicular regions measuring up to 13 mm in short axis and the largest retroperitoneal/pelvic/inguinal lymph nodes measuring up to 11 mm in short axis.  Mild splenomegaly of 13.3 cm was noted.  After his evaluation with hematology/oncology the lymphadenopathy  was thought to be secondary to his underlying diagnosis of lupus and there were no concerns for malignancy.  He does not require a scheduled follow-up appointment with them.     He reports since completing the steroid course and starting hydroxychloroquine he has been doing well.  He still notes some lymph nodes at the back of his neck and will experience joint pains occasionally.  No significant or consistent complaints at this time.  He is also describing symptoms of left heel pain which occurs in the morning or after being seated for a prolonged duration of time.  This improves after taking the first few steps.     Initially he was started on hydroxychloroquine at 200 mg twice daily which he took for the first few months.  After he started noticing an improvement in his symptoms and due to concerns for retinal toxicity he decreased the hydroxychloroquine to 200 mg once daily or every other day.  At times he will notice a slight flareup in his symptoms but this improves after he takes the hydroxychloroquine.        9/21/2023:  Patient presents for a follow-up of systemic lupus erythematosus.  He is currently not on hydroxychloroquine or steroids and states he discontinued the hydroxychloroquine approximately 2 to 3 months ago.  I reviewed his recent labs which showed a slightly elevated double-stranded DNA antibody of 12.  An ESR was elevated at 40.  A vitamin D level was low at 17.8.  A CBC, CMP, C-reactive protein, C3, C4, urine analysis and urine protein creatinine ratio were unremarkable.     Patient reports he discontinued the hydroxychloroquine a few months ago as he has overall been doing well and not experiencing any concerning symptoms.  He even traveled to [facility] and had sun exposure without any flareups noted.  His only complaint is periodic hives that he will experience which will self resolve.  No fevers, unintentional weight loss, alopecia, other types of skin rash, photosensitivity, mouth/nose  "ulcers, swollen glands, pleuritic chest pain or worsening joint pain/swelling/stiffness.  The lymphadenopathy has resolved.    9/26/24:  Pt reports no complains. UPC showed protein > 3 g. In settings of concern for Lupus nephritis nephrology referral provided, repeat UA/UPC, lupus activity blood work ordered along with hepatitis and TB. Pt started on steroids, MMF, HCQ, Bactrim and Prilosec. Follow up in 1 month.    The following portions of the patient's history were reviewed and updated as appropriate: allergies, current medications, past family history, past medical history, past social history, past surgical history and problem list.    History obtained from : patient  Review of Systems   Constitutional:  Negative for chills and fever.   HENT:  Negative for ear pain and sore throat.    Eyes:  Negative for pain and visual disturbance.   Respiratory:  Negative for cough and shortness of breath.    Cardiovascular:  Negative for chest pain and palpitations.   Gastrointestinal:  Negative for abdominal pain and vomiting.   Genitourinary:  Negative for dysuria and hematuria.   Musculoskeletal:  Negative for arthralgias and back pain.   Skin:  Negative for color change and rash.   Neurological:  Negative for seizures and syncope.   All other systems reviewed and are negative.        Denies:  Fever  Rash  Oral/nasal/genital ulcers  Muscle weakness  Uveitis  Dactylitis  Dysphagia/odynophagia  CP  NANCE  SOB at rest  Pleurisy  Stomach pain  Constipation/bloating  Hematochezia  Gross hematuria  Numbness/tingling  Joint issues other than noted above     Objective     /80 (BP Location: Right arm, Patient Position: Sitting, Cuff Size: Adult)   Pulse 97   Ht 5' 8\" (1.727 m)   Wt 102 kg (223 lb 12.8 oz)   SpO2 100%   BMI 34.03 kg/m²     Physical Exam  Vitals and nursing note reviewed.   Constitutional:       General: He is not in acute distress.     Appearance: He is well-developed.   HENT:      Head: Normocephalic and " atraumatic.   Eyes:      Conjunctiva/sclera: Conjunctivae normal.   Cardiovascular:      Rate and Rhythm: Normal rate and regular rhythm.      Heart sounds: No murmur heard.  Pulmonary:      Effort: Pulmonary effort is normal. No respiratory distress.      Breath sounds: Normal breath sounds.   Abdominal:      Palpations: Abdomen is soft.      Tenderness: There is no abdominal tenderness.   Musculoskeletal:         General: No swelling.      Cervical back: Neck supple.   Skin:     General: Skin is warm and dry.      Capillary Refill: Capillary refill takes less than 2 seconds.   Neurological:      Mental Status: He is alert.   Psychiatric:         Mood and Affect: Mood normal.     Administrative Statements   I have spent a total time of 60 minutes in caring for this patient on the day of the visit/encounter including Diagnostic results, Prognosis, Risks and benefits of tx options, Instructions for management, Patient and family education, Importance of tx compliance, Risk factor reductions, Impressions, Counseling / Coordination of care, Documenting in the medical record, Reviewing / ordering tests, medicine, procedures  , and Obtaining or reviewing history  .

## 2024-09-26 NOTE — ASSESSMENT & PLAN NOTE
"Pt expressing no active complains today however he mentioned that he noticed frothy urine  Diagnosed in 2022 when he had sudden onset of hands swelling and stiffness which progressed to whole body pain along with weight loss, hair loss  NOEMY+, DNA ab+, RNP+, zheng ab +, anti histone ab+  Pt was started on HCQ however he self discontinued (around June 2023) as he \"was feeling well\" despite explanation of preventive nature of treatment  Recent UPC was significant for protein >3 g with concern for lupus nephritis  Plan:  Repeat UA  Proceed with blood work, as below  Nephrology referral provided  Start prednisone 60 mg daily  Start  mg BID for 1 week, increase to 1000 mg BID on second week, follow with us for further instructions  Restart taking HCQ twice daily  Start Bactrim three times a week  Start Prilosec daily, keep 4 h apart from antiacid and immunosuppressant intake  Orders:    CBC and differential; Future    Comprehensive metabolic panel; Future    C-reactive protein; Future    Sedimentation rate, automated; Future    C4 complement; Future    C3 complement; Future    Anti-DNA antibody, double-stranded; Future    Urinalysis with microscopic; Future    Protein / creatinine ratio, urine; Future    Ambulatory Referral to Nephrology; Future    CBC and differential    Comprehensive metabolic panel    C-reactive protein    Sedimentation rate, automated    C4 complement    C3 complement    Anti-DNA antibody, double-stranded    Urinalysis with microscopic    Protein / creatinine ratio, urine    hydroxychloroquine (PLAQUENIL) 200 mg tablet; Take 1 tablet (200 mg total) by mouth 2 (two) times a day with meals    predniSONE 10 mg tablet; Take 6 tablets (60 mg total) by mouth daily    mycophenolate (CELLCEPT) 500 mg tablet; Take 1 tab twice daily x 1 week, then increase to 2 tabs twice daily    "

## 2024-10-01 ENCOUNTER — TELEPHONE (OUTPATIENT)
Dept: RHEUMATOLOGY | Facility: CLINIC | Age: 34
End: 2024-10-01

## 2024-10-01 ENCOUNTER — PATIENT MESSAGE (OUTPATIENT)
Dept: RHEUMATOLOGY | Facility: CLINIC | Age: 34
End: 2024-10-01

## 2024-10-01 ENCOUNTER — PREP FOR PROCEDURE (OUTPATIENT)
Dept: INTERVENTIONAL RADIOLOGY/VASCULAR | Facility: CLINIC | Age: 34
End: 2024-10-01

## 2024-10-01 ENCOUNTER — TELEPHONE (OUTPATIENT)
Dept: NEPHROLOGY | Facility: CLINIC | Age: 34
End: 2024-10-01

## 2024-10-01 DIAGNOSIS — M32.9 SYSTEMIC LUPUS ERYTHEMATOSUS, UNSPECIFIED SLE TYPE, UNSPECIFIED ORGAN INVOLVEMENT STATUS (HCC): Primary | ICD-10-CM

## 2024-10-01 DIAGNOSIS — R80.9 PROTEINURIA, UNSPECIFIED TYPE: ICD-10-CM

## 2024-10-01 DIAGNOSIS — M32.14 LUPUS NEPHRITIS (HCC): ICD-10-CM

## 2024-10-01 DIAGNOSIS — Z79.52 CURRENT CHRONIC USE OF SYSTEMIC STEROIDS: ICD-10-CM

## 2024-10-01 LAB
BACTERIA URNS QL MICRO: ABNORMAL
CASTS URNS QL MICRO: ABNORMAL /LPF
CREAT UR-MCNC: 116.7 MG/DL
EPI CELLS #/AREA URNS HPF: ABNORMAL /HPF (ref 0–10)
HBV CORE AB SERPL QL IA: NEGATIVE
HBV SURFACE AB SER QL: NON REACTIVE
HBV SURFACE AG SERPL QL IA: NEGATIVE
HCV AB S/CO SERPL IA: NON REACTIVE
MICRO URNS: NORMAL
PROT UR-MCNC: 264.8 MG/DL
PROT/CREAT UR: 2269 MG/G CREAT (ref 0–200)
RBC #/AREA URNS HPF: ABNORMAL /HPF (ref 0–2)
SL AMB INTERPRETATION: NORMAL
WBC #/AREA URNS HPF: ABNORMAL /HPF (ref 0–5)

## 2024-10-01 NOTE — TELEPHONE ENCOUNTER
Received a message from  patients rheumatologist and  asking for patient to be scheduled for earliest consult available. Please route call to office as we have availability 10/24 or 10/25 in [place] with .     M32.9 (ICD-10-CM) - Systemic lupus erythematosus, unspecified SLE type, unspecified organ involvement status (HCC)M32.14 (ICD-10-CM) - Lupus nephritis (HCC)R80.9 (ICD-10-CM) - Proteinuria, unspecified type

## 2024-10-01 NOTE — TELEPHONE ENCOUNTER
[facility] Receipt confirmed by pharmacy (9/26/2024  4:54 PM EDT)

## 2024-10-02 LAB
GAMMA INTERFERON BACKGROUND BLD IA-ACNC: 0.04 IU/ML
M TB IFN-G CD4+ T-CELLS BLD-ACNC: 0.03 IU/ML
M TB IFN-G CD4+ T-CELLS BLD-ACNC: 0.03 IU/ML
MITOGEN IGNF BLD-ACNC: 4.91 IU/ML
QUANTIFERON INCUBATION COMMENT: NORMAL
QUANTIFERON-TB GOLD PLUS: NEGATIVE
SERVICE CMNT-IMP: NORMAL

## 2024-10-03 ENCOUNTER — TELEPHONE (OUTPATIENT)
Dept: NEPHROLOGY | Facility: CLINIC | Age: 34
End: 2024-10-03

## 2024-10-04 ENCOUNTER — CONSULT (OUTPATIENT)
Dept: NEPHROLOGY | Facility: CLINIC | Age: 34
End: 2024-10-04
Payer: COMMERCIAL

## 2024-10-04 VITALS
WEIGHT: 216 LBS | HEIGHT: 68 IN | DIASTOLIC BLOOD PRESSURE: 86 MMHG | HEART RATE: 85 BPM | BODY MASS INDEX: 32.74 KG/M2 | SYSTOLIC BLOOD PRESSURE: 136 MMHG

## 2024-10-04 DIAGNOSIS — E78.1 HYPERTRIGLYCERIDEMIA: ICD-10-CM

## 2024-10-04 DIAGNOSIS — M32.9 SYSTEMIC LUPUS ERYTHEMATOSUS, UNSPECIFIED SLE TYPE, UNSPECIFIED ORGAN INVOLVEMENT STATUS (HCC): ICD-10-CM

## 2024-10-04 DIAGNOSIS — R31.21 ASYMPTOMATIC MICROSCOPIC HEMATURIA: Primary | ICD-10-CM

## 2024-10-04 DIAGNOSIS — E88.09 HYPOALBUMINEMIA: ICD-10-CM

## 2024-10-04 DIAGNOSIS — R80.9 NON-NEPHROTIC RANGE PROTEINURIA: ICD-10-CM

## 2024-10-04 DIAGNOSIS — E66.811 CLASS 1 OBESITY DUE TO EXCESS CALORIES WITHOUT SERIOUS COMORBIDITY WITH BODY MASS INDEX (BMI) OF 34.0 TO 34.9 IN ADULT: ICD-10-CM

## 2024-10-04 DIAGNOSIS — E66.09 CLASS 1 OBESITY DUE TO EXCESS CALORIES WITHOUT SERIOUS COMORBIDITY WITH BODY MASS INDEX (BMI) OF 34.0 TO 34.9 IN ADULT: ICD-10-CM

## 2024-10-04 PROCEDURE — 99204 OFFICE O/P NEW MOD 45 MIN: CPT | Performed by: STUDENT IN AN ORGANIZED HEALTH CARE EDUCATION/TRAINING PROGRAM

## 2024-10-04 NOTE — PATIENT INSTRUCTIONS
Thank you for coming to your visit today. As we discussed you kidney function is normal but you have proteins and blood in the urine.  Please  follow the recommendations below       Recommend low sodium (salt) food    Avoid nonsteroidal anti-inflammatory drugs such as Naprosyn, ibuprofen, Aleve, Advil, Celebrex, Meloxicam (Mobic) etc.  You can use Tylenol as needed if you do not have any liver condition to be concerned about    Recommend kidney biopsy     Try to exercise at least 30 minutes 3 days a week to begin with with an ultimate goal of 5 days a week for at least 30 minutes    Try to lose 5-10 lb by your next visit  Continue Cell cept (goal 1500mg twice a day)  Continue Prednisone 60mg . I will send you the taper via my chart  Schedule kidney biopsy     Next Visit in 1-2 months with results   If you need to see us earlier we can change the appointment for you      Joselyn Reyes Bahamonde, MD  Nephrology Attending         [email or web address]

## 2024-10-04 NOTE — PROGRESS NOTES
GLOMERULAR DISEASE OUTPATIENT CONSULTATION   Willy Penaloza 34 y.o. male MRN: [id number]  Date: 10/8/2024  Reason for consultation:   Chief Complaint   Patient presents with    Consult       ASSESSMENT AND PLAN:  34 y.o.  man with PMH of  SLE diagnosed in 2023 (in the settings of NOEMY>1:1280, dsDNA of 84, anti-RNP/Rothman antibody >8, antihistone antibody, low complement, anticardiolipin antibodies, arthritis, lymphadenopathy, Raynaud's syndrome) follow-up with Dr. Jacobson (rheumatology), who presents for initial consultation for hematuria, proteinuria.  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  Normal kidney function  Indication of kidney biopsy  [place] kit form filled  Patient agreed to proceed with biopsy  Non-nephrotic range proteinuria  UACR 2269 mg/g  Likely secondary to lupus nephritis possible component of class V  Kidney biopsy as above  Plan to start ACE inhibitors/ARB after biopsy  Agree with continuing with prednisone 60 mg and MMF  , Recommend to increase MMF to 1.5 g twice daily for treatment dose    Systemic lupus erythematosus, unspecified SLE type, unspecified organ involvement status (HCC)  Follow-up with rheumatologist  S/p prednisone on diagnosis then stopped  Patient is not on Plaquenil  He will need to start on Plaquenil after biopsy    Hypertriglyceridemia  Likely secondary to proteinuria although nonnephrotic   will monitor UACR    Hypoalbuminemia  Albumin 3.2 mg/dL  Secondary to proteinuria  Kidney biopsy  Class 1 obesity due to excess calories without serious comorbidity with body mass index (BMI) of 34.0 to 34.9 in adult      HISTORY OF PRESENT ILLNESS:  Willy Penaloza is a 34 y.o.  male from [facility] with medical issues of SLE diagnosed in 2023 (in the settings of NOEMY>1:1280, dsDNA of 84,  "anti-RNP/Rothman antibody >8, antihistone antibody, low complement, anticardiolipin antibodies, arthritis, lymphadenopathy, Raynaud's syndrome) follow-up with Dr. Jacobson (rheumatology), who presents for initial consultation for hematuria, proteinuria.  Patient was started on prednisone and CellCept on September 26, 2024 for concern of lupus nephritis    REVIEW OF SYSTEMS:    More than 10 point review of systems were obtained and discussed in length with the patient. Complete review of systems were negative / unremarkable except mentioned in the HPI section.    Review of Systems - General ROS: negative  Psychological ROS: negative  Ophthalmic ROS: negative  Allergy and Immunology ROS: negative  Endocrine ROS: negative  Respiratory ROS: no cough, shortness of breath, or wheezing  Cardiovascular ROS: no chest pain or dyspnea on exertion  Gastrointestinal ROS: no abdominal pain, change in bowel habits, or black or bloody stools  Genito-Urinary ROS: no dysuria, trouble voiding, or hematuria  Musculoskeletal ROS: negative  Neurological ROS: no TIA or stroke symptoms  Dermatological ROS: negative     PHYSICAL EXAM:  Vitals:    10/04/24 1007   BP: 136/86   BP Location: Left arm   Patient Position: Sitting   Cuff Size: Large   Pulse: 85   Weight: 98 kg (216 lb)   Height: 5' 8\" (1.727 m)     Body mass index is 32.84 kg/m².    Physical Exam  General:  no acute distress at this time  Skin:  No acute rash  Eyes:  No scleral icterus and noninjected  ENT:  mucous membranes moist  Neck:  no carotid bruits  Chest:  Clear to auscultation percussion, good respiratory effort, no use of accessory respiratory muscles  CVS:  Regular rate and rhythm without rub   Abdomen:  soft and nontender   Extremities: no significant lower extremity edema  Neuro:  No gross focality  Psych:  Alert , cooperative       PAST MEDICAL HISTORY:  History reviewed. No pertinent past medical history.    PAST SURGICAL HISTORY:  History reviewed. No pertinent " surgical history.    ALLERGIES:  Allergies   Allergen Reactions    Cat Hair Extract Sneezing    Other Sneezing     Environmental  allergies       SOCIAL HISTORY:  Social History     Substance and Sexual Activity   Alcohol Use Yes    Comment: social use only     Social History     Substance and Sexual Activity   Drug Use Never     Social History     Tobacco Use   Smoking Status Never    Passive exposure: Never   Smokeless Tobacco Never       FAMILY HISTORY:  History reviewed. No pertinent family history.    MEDICATIONS:    Current Outpatient Medications:     calcium carbonate-vitamin D 500 mg-5 mcg per tablet, Take 1 tablet by mouth 2 (two) times a day with meals, Disp: 180 tablet, Rfl: 1    hydroxychloroquine (PLAQUENIL) 200 mg tablet, Take 1 tablet (200 mg total) by mouth 2 (two) times a day with meals, Disp: 180 tablet, Rfl: 1    mycophenolate (CELLCEPT) 500 mg tablet, Take 1 tab twice daily x 1 week, then increase to 2 tabs twice daily, Disp: 360 tablet, Rfl: 1    omeprazole (PriLOSEC) 20 mg delayed release capsule, Take 1 capsule (20 mg total) by mouth daily, Disp: 90 capsule, Rfl: 0    predniSONE 10 mg tablet, Take 6 tablets (60 mg total) by mouth daily, Disp: 180 tablet, Rfl: 2    sulfamethoxazole-trimethoprim (BACTRIM DS) 800-160 mg per tablet, Take 1 tablet by mouth 3 (three) times a week, Disp: 12 tablet, Rfl: 2    Lab Results:   Results for orders placed or performed in visit on 09/26/24   Viral Hepatitis Screening and Diagnosis (HBV, HCV)    Collection Time: 09/30/24  8:41 AM   Result Value Ref Range    HBsAg Screen Negative Negative    Hepatitis B Surface Ab Qual Non Reactive     Hep B Core Total Ab Negative Negative    HEP C AB Non Reactive Non Reactive   QuantiFERON-TB Gold Plus [facility]    Collection Time: 09/30/24  8:41 AM   Result Value Ref Range    Quantiferon Incubation Comment Incubation performed.     Quantiferon-TB Gold Plus Negative Negative   Interpretation    Collection Time: 09/30/24  8:41  "AM   Result Value Ref Range    Interpretation: Comment    Quantiferon TB Gold Plus ([facility])    Collection Time: 09/30/24  8:41 AM   Result Value Ref Range    QuantiFERON Criteria Comment     LC QFT TB1-NIL 0.03 IU/mL    LC QFT TB2-NIL 0.03 IU/mL    LC QFT NIL 0.04 IU/mL    LC QFT MITOGEN-NIL 4.91 IU/mL   Urinalysis with microscopic    Collection Time: 09/30/24  8:44 AM   Result Value Ref Range    Microscopic Examination See below:    Protein / creatinine ratio, urine    Collection Time: 09/30/24  8:44 AM   Result Value Ref Range    Creatinine, Urine 116.7 Not Estab. mg/dL    Total Protein, Urine 264.8 Not Estab. mg/dL    Prot/Creat Ratio, Ur 2,269 (H) 0 - 200 mg/g creat   Microscopic Examination    Collection Time: 09/30/24  8:44 AM   Result Value Ref Range    SL AMB WBC, URINE 11-30 (A) 0 - 5 /hpf    RBC, Urine 11-30 (A) 0 - 2 /hpf    Epithelial Cells (non renal) 0-10 0 - 10 /hpf    Casts None seen None seen /lpf    Bacteria, Urine None seen None seen/Few       Portions of the record may have been created with voice recognition software. Occasional wrong word or \"sound a like\" substitutions may have occurred due to the inherent limitations of voice recognition software. Read the chart carefully and recognize, using context, where substitutions have occurred.    "

## 2024-10-08 ENCOUNTER — TELEPHONE (OUTPATIENT)
Age: 34
End: 2024-10-08

## 2024-10-08 RX ORDER — SODIUM CHLORIDE 9 MG/ML
75 INJECTION, SOLUTION INTRAVENOUS CONTINUOUS
OUTPATIENT
Start: 2024-10-08

## 2024-10-08 NOTE — ASSESSMENT & PLAN NOTE
Follow-up with rheumatologist  S/p prednisone on diagnosis then stopped  Patient is not on Plaquenil  He will need to start on Plaquenil after biopsy

## 2024-10-08 NOTE — ASSESSMENT & PLAN NOTE
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

## 2024-10-08 NOTE — TELEPHONE ENCOUNTER
Patient called in regarding 3 missed calls he had about an hour I asked the patient if anyone left him a vm he stated no  , no notes on the patient chart . Please advise   I did advise the patient it could of been an appt reminder regarding his upcoming procedure .

## 2024-10-08 NOTE — ASSESSMENT & PLAN NOTE
UACR 2269 mg/g  Likely secondary to lupus nephritis possible component of class V  Kidney biopsy as above  Plan to start ACE inhibitors/ARB after biopsy  Agree with continuing with prednisone 60 mg and MMF  , Recommend to increase MMF to 1.5 g twice daily for treatment dose

## 2024-10-09 NOTE — PRE-PROCEDURE INSTRUCTIONS
Pre-procedure Instructions for Interventional Radiology  [address] [address]  INTERVENTIONAL RADIOLOGY [phone]    You are scheduled for a/an kidney biopsy.    On Tuesday 10-15-24.    Your tentative arrival time is 10am.  Short stay will notify you the day before your procedure with the exact arrival time and the location to arrive.    To prepare for your procedure:  Please arrange for someone to drive you home after the procedure and stay with you until the next morning if you are instructed to do so.  This is typically for patients receiving some type of sedative or anesthetic for the procedure.  DO NOT EAT OR DRINK ANYTHING after midnight on the evening before your procedure including candy & gum.  ONLY SIPS OF WATER with your medications are allowed on the morning of your procedure.  TAKE ALL OF YOUR REGULAR MEDICATIONS THE MORNING OF YOUR PROCEDURE with sips of water!  We may call you to stop some of your blood sugar, blood pressure and blood thinning medications depending on the procedure.  Please take all of these medications unless we instruct you to stop them.  If you have an allergy to x-ray dye, please contact Interventional Radiology for an x-ray dye preparation which usually consists of an oral steroid and Benadryl.    The day of your procedure:  Bring a list of the medications you take at home.  Bring medications you take for breathing problems (such as inhalers), medications for chest pain, or both.  Bring a case for your glasses or contacts.  Bring your insurance card and a form of photo ID.  Please leave all valuables such as credit cards and jewelry at home.  Report to the admitting office to the left of the registration desk in the main lobby at the [facility], Entrance B.  You will then be directed to the Short Stay Center.  While your procedure is being performed, your family may wait in the Radiology Waiting Room on the 1st floor in  Radiology.  if they need to leave, they may provide a number to be called following the procedure.   Be prepared to stay overnight just in case. Sometimes procedures will indicate the need for further observation or treatment.   If you are scheduled for a follow-up visit with the Interventional Radiologist after your procedure, you will be called with a date and time.    Special Instructions (Medications to stop taking before your procedure etc.):  No aspirin products for 5 days before the procedure.

## 2024-10-11 ENCOUNTER — TELEPHONE (OUTPATIENT)
Age: 34
End: 2024-10-11

## 2024-10-11 NOTE — TELEPHONE ENCOUNTER
Called back patient's insurance [employer] and spoke with Candido to let them know that CPT/ HCPCS Code(s): 12192 34449.  He stated that is not required prior authorization. No further questions at this time.      Reference#[id number].

## 2024-10-11 NOTE — TELEPHONE ENCOUNTER
Pt insurance [employer] calling to check on cpt code for biopsy of the kidney.     They would like a call back with cpt code and would like for the pt to be notified.

## 2024-10-12 LAB
25(OH)D3+25(OH)D2 SERPL-MCNC: NORMAL NG/ML
ALBUMIN SERPL-MCNC: NORMAL G/DL
ALP SERPL-CCNC: NORMAL U/L
ALT SERPL-CCNC: NORMAL U/L
APPEARANCE UR: NORMAL
AST SERPL-CCNC: NORMAL U/L
BASOPHILS # BLD AUTO: 0 X10E3/UL (ref 0–0.2)
BASOPHILS NFR BLD AUTO: 0 %
BILIRUB SERPL-MCNC: NORMAL MG/DL
BILIRUB UR QL STRIP: NORMAL
BUN SERPL-MCNC: NORMAL MG/DL
BUN/CREAT SERPL: NORMAL
C3 SERPL-MCNC: NORMAL MG/DL
C4 SERPL-MCNC: NORMAL MG/DL
CALCIUM SERPL-MCNC: NORMAL MG/DL
CHLORIDE SERPL-SCNC: NORMAL MMOL/L
CO2 SERPL-SCNC: NORMAL MMOL/L
COLOR UR: NORMAL
CREAT SERPL-MCNC: NORMAL MG/DL
CREAT UR-MCNC: NORMAL MG/DL
CRP SERPL-MCNC: NORMAL MG/L
DSDNA AB SER-ACNC: NORMAL [IU]/ML
EGFR: NORMAL
EOSINOPHIL # BLD AUTO: 0.2 X10E3/UL (ref 0–0.4)
EOSINOPHIL NFR BLD AUTO: 1 %
ERYTHROCYTE [DISTWIDTH] IN BLOOD BY AUTOMATED COUNT: 15.7 % (ref 11.6–15.4)
ERYTHROCYTE [SEDIMENTATION RATE] IN BLOOD BY WESTERGREN METHOD: NORMAL MM/H
GLOBULIN SER-MCNC: NORMAL G/DL
GLUCOSE SERPL-MCNC: NORMAL MG/DL
GLUCOSE UR QL: NORMAL
HCT VFR BLD AUTO: 39.9 % (ref 37.5–51)
HGB BLD-MCNC: 13.2 G/DL (ref 13–17.7)
HGB UR QL STRIP: NORMAL
IMM GRANULOCYTES # BLD: 0.2 X10E3/UL (ref 0–0.1)
IMM GRANULOCYTES NFR BLD: 1 %
KETONES UR QL STRIP: NORMAL
LEUKOCYTE ESTERASE UR QL STRIP: NORMAL
LYMPHOCYTES # BLD AUTO: 4.1 X10E3/UL (ref 0.7–3.1)
LYMPHOCYTES NFR BLD AUTO: 36 %
MCH RBC QN AUTO: 27 PG (ref 26.6–33)
MCHC RBC AUTO-ENTMCNC: 33.1 G/DL (ref 31.5–35.7)
MCV RBC AUTO: 82 FL (ref 79–97)
MICRO URNS: NORMAL
MICRO URNS: NORMAL
MONOCYTES # BLD AUTO: 0.8 X10E3/UL (ref 0.1–0.9)
MONOCYTES NFR BLD AUTO: 7 %
NEUTROPHILS # BLD AUTO: 6 X10E3/UL (ref 1.4–7)
NEUTROPHILS NFR BLD AUTO: 55 %
NITRITE UR QL STRIP: NORMAL
PH UR STRIP: NORMAL [PH]
PLATELET # BLD AUTO: 303 X10E3/UL (ref 150–450)
POTASSIUM SERPL-SCNC: NORMAL MMOL/L
PROT SERPL-MCNC: NORMAL G/DL
PROT UR QL STRIP: NORMAL
PROT UR-MCNC: NORMAL G/DL
PROT/CREAT UR: NORMAL MG/G{CREAT}
RBC # BLD AUTO: 4.89 X10E6/UL (ref 4.14–5.8)
SODIUM SERPL-SCNC: NORMAL MMOL/L
SP GR UR: NORMAL
UROBILINOGEN UR STRIP-ACNC: NORMAL
WBC # BLD AUTO: 11.2 X10E3/UL (ref 3.4–10.8)

## 2024-10-14 LAB
25(OH)D3+25(OH)D2 SERPL-MCNC: 15.9 NG/ML (ref 30–100)
ALBUMIN SERPL-MCNC: 3.7 G/DL (ref 4.1–5.1)
ALP SERPL-CCNC: 47 IU/L (ref 44–121)
ALT SERPL-CCNC: 22 IU/L (ref 0–44)
APPEARANCE UR: CLEAR
AST SERPL-CCNC: 16 IU/L (ref 0–40)
BACTERIA URNS QL MICRO: ABNORMAL
BILIRUB SERPL-MCNC: 0.4 MG/DL (ref 0–1.2)
BILIRUB UR QL STRIP: NEGATIVE
BUN SERPL-MCNC: 22 MG/DL (ref 6–20)
BUN/CREAT SERPL: 22 (ref 9–20)
C3 SERPL-MCNC: 88 MG/DL (ref 82–167)
C4 SERPL-MCNC: 8 MG/DL (ref 12–38)
CALCIUM SERPL-MCNC: 9.2 MG/DL (ref 8.7–10.2)
CASTS URNS MICRO: ABNORMAL
CASTS URNS QL MICRO: PRESENT /LPF
CHLORIDE SERPL-SCNC: 101 MMOL/L (ref 96–106)
CO2 SERPL-SCNC: 26 MMOL/L (ref 20–29)
COLOR UR: YELLOW
CREAT SERPL-MCNC: 1.01 MG/DL (ref 0.76–1.27)
CREAT UR-MCNC: 145.5 MG/DL
CRP SERPL-MCNC: 2 MG/L (ref 0–10)
DSDNA AB SER-ACNC: 20 IU/ML (ref 0–9)
EGFR: 100 ML/MIN/1.73
EPI CELLS #/AREA URNS HPF: ABNORMAL /HPF (ref 0–10)
ERYTHROCYTE [SEDIMENTATION RATE] IN BLOOD BY WESTERGREN METHOD: 16 MM/HR (ref 0–15)
GLOBULIN SER-MCNC: 3.1 G/DL (ref 1.5–4.5)
GLUCOSE SERPL-MCNC: 83 MG/DL (ref 70–99)
GLUCOSE UR QL: NEGATIVE
HGB UR QL STRIP: ABNORMAL
KETONES UR QL STRIP: NEGATIVE
LEUKOCYTE ESTERASE UR QL STRIP: NEGATIVE
MICRO URNS: ABNORMAL
NITRITE UR QL STRIP: NEGATIVE
PH UR STRIP: 6.5 [PH] (ref 5–7.5)
POTASSIUM SERPL-SCNC: 3.7 MMOL/L (ref 3.5–5.2)
PROT SERPL-MCNC: 6.8 G/DL (ref 6–8.5)
PROT UR QL STRIP: ABNORMAL
PROT UR-MCNC: 326.3 MG/DL
PROT/CREAT UR: 2243 MG/G CREAT (ref 0–200)
RBC #/AREA URNS HPF: >30 /HPF (ref 0–2)
SODIUM SERPL-SCNC: 137 MMOL/L (ref 134–144)
SP GR UR: 1.03 (ref 1–1.03)
UROBILINOGEN UR STRIP-ACNC: 0.2 MG/DL (ref 0.2–1)
WBC #/AREA URNS HPF: ABNORMAL /HPF (ref 0–5)

## 2024-10-15 ENCOUNTER — HOSPITAL ENCOUNTER (OUTPATIENT)
Dept: RADIOLOGY | Facility: HOSPITAL | Age: 34
Discharge: HOME | End: 2024-10-15
Attending: RADIOLOGY

## 2024-10-17 LAB
ALBUMIN/CREAT UR: 1231 MG/G CREAT (ref 0–29)
CREAT UR-MCNC: 147 MG/DL
CRYOGLOB SER QL 1D COLD INC: NORMAL
MICROALBUMIN UR-MCNC: 1810.3 UG/ML

## 2024-10-20 PROBLEM — E55.9 VITAMIN D DEFICIENCY: Status: ACTIVE | Noted: 2024-10-20

## 2024-10-20 PROBLEM — Z79.624 ON MYCOPHENOLATE MOFETIL THERAPY: Status: ACTIVE | Noted: 2024-10-20

## 2024-10-20 PROBLEM — M32.14 LUPUS NEPHRITIS (HCC): Status: ACTIVE | Noted: 2024-10-20

## 2024-10-20 PROBLEM — Z79.52 CURRENT CHRONIC USE OF SYSTEMIC STEROIDS: Status: ACTIVE | Noted: 2024-10-20

## 2024-10-20 PROBLEM — Z79.899 LONG-TERM USE OF PLAQUENIL: Status: ACTIVE | Noted: 2024-10-20

## 2024-10-21 ENCOUNTER — TELEPHONE (OUTPATIENT)
Age: 34
End: 2024-10-21

## 2024-10-21 ENCOUNTER — TELEPHONE (OUTPATIENT)
Dept: RHEUMATOLOGY | Facility: CLINIC | Age: 34
End: 2024-10-21

## 2024-10-21 NOTE — TELEPHONE ENCOUNTER
Noted, the only changes for today were clarifying the prednisone dose and I see Dr. Reyes gave him a taper. He can also increase the mycophenolate to 3 tablets twice daily.

## 2024-10-21 NOTE — TELEPHONE ENCOUNTER
The patient was called to clarify the prednisone dose and it was seen that  Dr. Reyes gave him a taper. He can also increase the mycophenolate to 3 tablets twice daily.

## 2024-10-21 NOTE — TELEPHONE ENCOUNTER
Patient is calling to reschedule his follow-up appointment with Dr. Jacobson on 10/21 at 1:30pm. We offered patient 11/4 at 1pm and he accepted. Patient is having a biopsy done on 10/29 and would like to discuss the results with Dr. Jacobson during the appointment.    Thank you.

## 2024-10-24 NOTE — NURSING NOTE
Pre-procedure Instructions for Interventional Radiology  [address]  [address]  INTERVENTIONAL RADIOLOGY [phone]    You are scheduled for a/an kidney biopsy.  On Tuesday, Oct 29.    Your arrival time is 0715.        To prepare for your procedure:  Please arrange for someone to drive you home after the procedure and stay with you until the next morning if you are instructed to do so.  This is typically for patients receiving some type of sedative or anesthetic for the procedure.  DO NOT EAT OR DRINK ANYTHING after midnight on the evening before your procedure including candy & gum.  ONLY SIPS OF WATER with your medications are allowed on the morning of your procedure.  TAKE ALL OF YOUR REGULAR MEDICATIONS THE MORNING OF YOUR PROCEDURE with sips of water!  We may call you to stop some of your blood sugar, blood pressure and blood thinning medications depending on the procedure.  Please take all of these medications unless we instruct you to stop them.  If you have an allergy to x-ray dye, please contact Interventional Radiology for an x-ray dye preparation which usually consists of an oral steroid and Benadryl.  The day of your procedure:  Bring a list of the medications you take at home.  Bring medications you take for breathing problems (such as inhalers), medications for chest pain, or both.  Bring a case for your glasses or contacts.  Bring your insurance card and a form of photo ID.  Please leave all valuables such as credit cards and jewelry at home.  Report to the registration desk in the main lobby at the [facility].  Ask to be directed to the Short Procedure Unit on the 2nd floor.  While your procedure is being performed, your family may wait in the Waiting Room on the 2nd floor. If they need to leave, they may provide a number to be called following the procedure.   Be prepared to stay overnight just in case. Sometimes procedures will indicate the need  for further observation or treatment.   If you are scheduled for a follow-up visit with the Interventional Radiologist after your procedure, you will be called with a date and time.  Special Instructions (Medications to stop taking before your procedure etc.):    home after 4 hour recovery

## 2024-10-29 ENCOUNTER — HOSPITAL ENCOUNTER (OUTPATIENT)
Dept: RADIOLOGY | Facility: HOSPITAL | Age: 34
Discharge: HOME | End: 2024-10-29
Attending: RADIOLOGY
Payer: COMMERCIAL

## 2024-10-29 VITALS
BODY MASS INDEX: 33.11 KG/M2 | DIASTOLIC BLOOD PRESSURE: 58 MMHG | WEIGHT: 218.48 LBS | TEMPERATURE: 97.5 F | OXYGEN SATURATION: 98 % | RESPIRATION RATE: 16 BRPM | SYSTOLIC BLOOD PRESSURE: 127 MMHG | HEART RATE: 89 BPM | HEIGHT: 68 IN

## 2024-10-29 DIAGNOSIS — M32.9 SYSTEMIC LUPUS ERYTHEMATOSUS, UNSPECIFIED SLE TYPE, UNSPECIFIED ORGAN INVOLVEMENT STATUS (HCC): ICD-10-CM

## 2024-10-29 LAB
ERYTHROCYTE [DISTWIDTH] IN BLOOD BY AUTOMATED COUNT: 15.9 % (ref 11.6–15.1)
HCT VFR BLD AUTO: 38.6 % (ref 36.5–49.3)
HGB BLD-MCNC: 12.2 G/DL (ref 12–17)
INR PPP: 0.86 (ref 0.85–1.19)
MCH RBC QN AUTO: 26.5 PG (ref 26.8–34.3)
MCHC RBC AUTO-ENTMCNC: 31.6 G/DL (ref 31.4–37.4)
MCV RBC AUTO: 84 FL (ref 82–98)
PLATELET # BLD AUTO: 229 THOUSANDS/UL (ref 149–390)
PMV BLD AUTO: 9.4 FL (ref 8.9–12.7)
PROTHROMBIN TIME: 12.2 SECONDS (ref 12.3–15)
RBC # BLD AUTO: 4.6 MILLION/UL (ref 3.88–5.62)
WBC # BLD AUTO: 10.01 THOUSAND/UL (ref 4.31–10.16)

## 2024-10-29 PROCEDURE — 85610 PROTHROMBIN TIME: CPT | Performed by: RADIOLOGY

## 2024-10-29 PROCEDURE — 88305 TISSUE EXAM BY PATHOLOGIST: CPT | Performed by: INTERNAL MEDICINE

## 2024-10-29 PROCEDURE — 88313 SPECIAL STAINS GROUP 2: CPT | Performed by: INTERNAL MEDICINE

## 2024-10-29 PROCEDURE — 99153 MOD SED SAME PHYS/QHP EA: CPT

## 2024-10-29 PROCEDURE — 50200 RENAL BIOPSY PERQ: CPT

## 2024-10-29 PROCEDURE — 99152 MOD SED SAME PHYS/QHP 5/>YRS: CPT

## 2024-10-29 PROCEDURE — 88348 ELECTRON MICROSCOPY DX: CPT | Performed by: INTERNAL MEDICINE

## 2024-10-29 PROCEDURE — 77012 CT SCAN FOR NEEDLE BIOPSY: CPT

## 2024-10-29 PROCEDURE — 85027 COMPLETE CBC AUTOMATED: CPT | Performed by: RADIOLOGY

## 2024-10-29 PROCEDURE — 77012 CT SCAN FOR NEEDLE BIOPSY: CPT | Performed by: STUDENT IN AN ORGANIZED HEALTH CARE EDUCATION/TRAINING PROGRAM

## 2024-10-29 PROCEDURE — 50200 RENAL BIOPSY PERQ: CPT | Performed by: STUDENT IN AN ORGANIZED HEALTH CARE EDUCATION/TRAINING PROGRAM

## 2024-10-29 PROCEDURE — 88350 IMFLUOR EA ADDL 1ANTB STN PX: CPT | Performed by: INTERNAL MEDICINE

## 2024-10-29 PROCEDURE — 99152 MOD SED SAME PHYS/QHP 5/>YRS: CPT | Performed by: STUDENT IN AN ORGANIZED HEALTH CARE EDUCATION/TRAINING PROGRAM

## 2024-10-29 PROCEDURE — 88346 IMFLUOR 1ST 1ANTB STAIN PX: CPT | Performed by: INTERNAL MEDICINE

## 2024-10-29 RX ORDER — HYDRALAZINE HYDROCHLORIDE 20 MG/ML
INJECTION INTRAMUSCULAR; INTRAVENOUS AS NEEDED
Status: COMPLETED | OUTPATIENT
Start: 2024-10-29 | End: 2024-10-29

## 2024-10-29 RX ORDER — MIDAZOLAM HYDROCHLORIDE 2 MG/2ML
INJECTION, SOLUTION INTRAMUSCULAR; INTRAVENOUS AS NEEDED
Status: COMPLETED | OUTPATIENT
Start: 2024-10-29 | End: 2024-10-29

## 2024-10-29 RX ORDER — FENTANYL CITRATE 50 UG/ML
INJECTION, SOLUTION INTRAMUSCULAR; INTRAVENOUS AS NEEDED
Status: COMPLETED | OUTPATIENT
Start: 2024-10-29 | End: 2024-10-29

## 2024-10-29 RX ORDER — LIDOCAINE WITH 8.4% SOD BICARB 0.9%(10ML)
SYRINGE (ML) INJECTION AS NEEDED
Status: COMPLETED | OUTPATIENT
Start: 2024-10-29 | End: 2024-10-29

## 2024-10-29 RX ORDER — SODIUM CHLORIDE 9 MG/ML
75 INJECTION, SOLUTION INTRAVENOUS CONTINUOUS
Status: DISCONTINUED | OUTPATIENT
Start: 2024-10-29 | End: 2024-10-30 | Stop reason: HOSPADM

## 2024-10-29 RX ORDER — OXYCODONE HYDROCHLORIDE 5 MG/1
5 TABLET ORAL EVERY 4 HOURS PRN
Status: DISCONTINUED | OUTPATIENT
Start: 2024-10-29 | End: 2024-10-30 | Stop reason: HOSPADM

## 2024-10-29 RX ADMIN — MIDAZOLAM 1 MG: 1 INJECTION INTRAMUSCULAR; INTRAVENOUS at 11:41

## 2024-10-29 RX ADMIN — HYDRALAZINE HYDROCHLORIDE 10 MG: 20 INJECTION, SOLUTION INTRAMUSCULAR; INTRAVENOUS at 11:38

## 2024-10-29 RX ADMIN — Medication 10 ML: at 11:36

## 2024-10-29 RX ADMIN — MIDAZOLAM 1 MG: 1 INJECTION INTRAMUSCULAR; INTRAVENOUS at 11:50

## 2024-10-29 RX ADMIN — MIDAZOLAM 1 MG: 1 INJECTION INTRAMUSCULAR; INTRAVENOUS at 11:35

## 2024-10-29 RX ADMIN — FENTANYL CITRATE 25 MCG: 50 INJECTION, SOLUTION INTRAMUSCULAR; INTRAVENOUS at 11:41

## 2024-10-29 RX ADMIN — FENTANYL CITRATE 50 MCG: 50 INJECTION, SOLUTION INTRAMUSCULAR; INTRAVENOUS at 11:50

## 2024-10-29 RX ADMIN — FENTANYL CITRATE 50 MCG: 50 INJECTION, SOLUTION INTRAMUSCULAR; INTRAVENOUS at 11:30

## 2024-10-29 RX ADMIN — FENTANYL CITRATE 25 MCG: 50 INJECTION, SOLUTION INTRAMUSCULAR; INTRAVENOUS at 12:01

## 2024-10-29 RX ADMIN — MIDAZOLAM 1 MG: 1 INJECTION INTRAMUSCULAR; INTRAVENOUS at 11:30

## 2024-10-29 RX ADMIN — FENTANYL CITRATE 50 MCG: 50 INJECTION, SOLUTION INTRAMUSCULAR; INTRAVENOUS at 11:36

## 2024-10-29 RX ADMIN — HYDRALAZINE HYDROCHLORIDE 10 MG: 20 INJECTION, SOLUTION INTRAMUSCULAR; INTRAVENOUS at 11:56

## 2024-10-29 NOTE — PERIOPERATIVE NURSING NOTE
Patient received into my care, alert and oriented.  Denies pain or discomfort at this time.  Left side of back, flank area, bandaid clean, dry and intact. Patient resting comfortably.

## 2024-10-29 NOTE — H&P
Interventional Radiology  History and Physical 10/29/2024     Willy Penaloza   1990   [id number]    Assessment/Plan:  Very pleasant 34-year-old man with history of SLE diagnosed 2023 now also with asymptomatic microscopic hematuria and non-nephrotic range proteinuria, referred for kidney biopsy to evaluate for suspected lupus nephritis.    Problem List Items Addressed This Visit       Systemic lupus erythematosus, unspecified SLE type, unspecified organ involvement status (HCC)    Relevant Orders    IR biopsy kidney random          Subjective:     Patient ID: Willy Penaloza is a 34 y.o. male.    History of Present Illness  Very pleasant 34-year-old man with history of SLE diagnosed 2023 now also with asymptomatic microscopic hematuria and non-nephrotic range proteinuria, referred for kidney biopsy to evaluate for suspected lupus nephritis.          No past medical history on file.     No past surgical history on file.     Social History     Tobacco Use   Smoking Status Never    Passive exposure: Never   Smokeless Tobacco Never        Social History     Substance and Sexual Activity   Alcohol Use Yes    Comment: social use only        Social History     Substance and Sexual Activity   Drug Use Never        Allergies   Allergen Reactions    Cat Hair Extract Sneezing    Other Sneezing     Environmental  allergies       Current Outpatient Medications   Medication Sig Dispense Refill    calcium carbonate-vitamin D 500 mg-5 mcg per tablet Take 1 tablet by mouth 2 (two) times a day with meals 180 tablet 1    hydroxychloroquine (PLAQUENIL) 200 mg tablet Take 1 tablet (200 mg total) by mouth 2 (two) times a day with meals 180 tablet 1    mycophenolate (CELLCEPT) 500 mg tablet Take 1 tab twice daily x 1 week, then increase to 2 tabs twice daily 360 tablet 1    omeprazole (PriLOSEC) 20 mg delayed release capsule Take 1 capsule (20 mg total) by mouth daily 90 capsule 0    predniSONE 10 mg tablet Take 6 tablets (60 mg  "total) by mouth daily 180 tablet 2    sulfamethoxazole-trimethoprim (BACTRIM DS) 800-160 mg per tablet Take 1 tablet by mouth 3 (three) times a week 12 tablet 2     Current Facility-Administered Medications   Medication Dose Route Frequency Provider Last Rate Last Admin    sodium chloride 0.9 % infusion  75 mL/hr Intravenous Continuous Salvador Santiago Sirena,               Objective:    Vitals:    10/29/24 0807   BP: 142/85   Pulse: 72   Resp: 18   Temp: 98.2 °F (36.8 °C)   TempSrc: Temporal   SpO2: 99%   Weight: 99.1 kg (218 lb 7.6 oz)   Height: 5' 8\" (1.727 m)     Physical Exam:  General: Well appearing, no acute distress.  HEENT: NC/AT.  EOMI.  CV: RRR  Chest: Normal respiratory effort.  Speaking in full sentences.  Abdomen: Soft, ND/NT.  Skin: Warm and dry  Neuro: Alert and oriented x 3.  No focal deficits.           No results found for: \"BNP\"   Lab Results   Component Value Date    WBC 10.01 10/29/2024    HGB 12.2 10/29/2024    HCT 38.6 10/29/2024    MCV 84 10/29/2024     10/29/2024     Lab Results   Component Value Date    INR 0.86 10/29/2024    PROTIME 12.2 (L) 10/29/2024     No results found for: \"PTT\"      I have personally reviewed pertinent imaging and laboratory results.     Code Status: No Order  Advance Directive and Living Will:      Power of :    POLST:      This text is generated with voice recognition software. There may be translation, syntax,  or grammatical errors. If you have any questions, please contact the dictating provider.   "

## 2024-10-29 NOTE — DISCHARGE INSTRUCTIONS
Percutaneous Kidney Biopsy   WHAT YOU NEED TO KNOW:   A percutaneous kidney biopsy is a procedure to remove a small sample of kidney tissue. It may also be done to check for kidney disease or cancer.     DISCHARGE INSTRUCTIONS:   Follow up with your healthcare provider as directed:  Write down your questions so you remember to ask them during your visits.   Wound care:  The Band-Aid may be removed in 24 hours.                                                                                                  For more information:   National Kidney and Urologic Diseases Information Clearinghouse  [address] [place] [address]  Phone: 0- [phone]  Web Address: http://kidney.niddk.nih.gov/   Care after your procedure:    1. Limit your activities for 36 hours after your biopsy.    2. No driving day of biopsy.    3. Return to your normal diet. Flat sips of flat soda helps with mild nausea.    4. Remove band-aid or dressing 24 hours after procedure.       Contact Interventional Radiology at [phone]    ([place] PATIENTS: Contact Interventional Radiology at [phone]) ([place] PATIENTS: Contact Interventional Radiology at [phone]) if:    1. Difficulty breathing, nausea or vomiting.    2  You feel weak or dizzy.    3. Chills or fever above 101 degrees F. You have persistent nausea or vomiting    4. You have severe pain in your abdomen or where the procedure was done.    5  You have blood in your urine. You urinate small amounts or not at all.    6. Develop any redness, swelling, heat, unusual drainage, heavy bruising or bleeding from biopsy site.              Moderate Sedation   WHAT YOU NEED TO KNOW:   Moderate sedation, or conscious sedation, is medicine used during procedures to help you feel relaxed and calm. You will be awake and able to follow directions without anxiety or pain. You will remember little to none of the procedure. You may feel tired, weak, or unsteady on your feet after you  get sedation. You may also have trouble concentrating or short-term memory loss. These symptoms should go away in 24 hours or less.   DISCHARGE INSTRUCTIONS:   Call 911 or have someone else call for any of the following:   You have sudden trouble breathing.     You cannot be woken.  Seek care immediately if:   You have a severe headache or dizziness.     Your heart is beating faster than usual.  Contact your healthcare provider if:   You have a fever.     You have nausea or are vomiting for more than 8 hours after the procedure.      Your skin is itchy, swollen, or you have a rash.     You have questions or concerns about your condition or care.  Self-care:   Have someone stay with you for 24 hours. This person can drive you to errands and help you do things around the house. This person can also watch for problems.      Rest and do quiet activities for 24 hours. Do not exercise, ride a bike, or play sports. Stand up slowly to prevent dizziness and falls. Take short walks around the house with another person. Slowly return to your usual activities the next day.      Do not drive or use dangerous machines or tools for 24 hours. You may injure yourself or others. Examples include a lawnmower, saw, or drill. Do not return to work for 24 hours if you use dangerous machines or tools for work.      Do not make important decisions for 24 hours. For example, do not sign important papers or invest money.      Drink liquids as directed. Liquids help flush the sedation medicine out of your body. Ask how much liquid to drink each day and which liquids are best for you.      Eat small, frequent meals to prevent nausea and vomiting. Start with clear liquids such as juice or broth. If you do not vomit after clear liquids, you can eat your usual foods.      Do not drink alcohol or take medicines that make you drowsy. This includes medicines that help you sleep and anxiety medicines. Ask your healthcare provider if it is safe for  you to take pain medicine.  Follow up with your healthcare provider as directed: Write down your questions so you remember to ask them during your visits.   © 2017 [employer] Information is for End User's use only and may not be sold, redistributed or otherwise used for commercial purposes. All illustrations and images included in CareNotes® are the copyrighted property of [employer], [employer]. or [employer].  The above information is an  only. It is not intended as medical advice for individual conditions or treatments. Talk to your doctor, nurse or pharmacist before following any medical regimen to see if it is safe and effective for you.

## 2024-10-29 NOTE — BRIEF OP NOTE (RAD/CATH)
INTERVENTIONAL RADIOLOGY PROCEDURE NOTE    Date: 10/29/2024    Procedure:   Procedure Summary       Date: 10/29/24 Room / Location: [facility] CAT Scan    Anesthesia Start:  Anesthesia Stop:     Procedure: IR BIOPSY KIDNEY RANDOM Diagnosis:       Systemic lupus erythematosus, unspecified SLE type, unspecified organ involvement status (HCC)      (concern for lupus nephritis)    Scheduled Providers:  Responsible Provider:     Anesthesia Type: Not recorded ASA Status: Not recorded            Preoperative diagnosis:   1. Systemic lupus erythematosus, unspecified SLE type, unspecified organ involvement status (HCC)         Postoperative diagnosis: Same.    Surgeon: Marvin Ferguson MD     Assistant: None. No qualified resident was available.    Blood loss: Minimal    Specimens: Six 18-gauge x 1.3 cm cores     Findings:   Successful CT-guided kidney biopsy, targeting the left kidney lower pole cortex.    Mildly challenging access due to respiratory variability.  First access was too central, and samples did not demonstrate glomeruli.  The access site was embolized with Gelfoam pledgets.    A second puncture into the renal parenchyma was required, approximately 1 cm caudal to the first.  Samples here were adequate.    The access tract was embolized with D-stat flowable hemostatic agent during removal of the access needle.    Completion CT demonstrated expected post-procedural changes without evidence of significant hemorrhage or hematoma.    Complications: None immediate.    Anesthesia: conscious sedation

## 2024-10-29 NOTE — PERIOPERATIVE NURSING NOTE
Received patient from PACU, alert and oriented x4, vital signs are stable. Patient verbalizes pain as 0/10, denies further pain or discomfort. Bandaid on left side of back flank clean, dry and intact. Patient given juice and boxed lunch tolerating oral intake. Patient resting in bed and aware of bedrest until 1630, call light in reach and patient surrounding cleared. Plan of care ongoing.

## 2024-10-29 NOTE — PERIOPERATIVE NURSING NOTE
Discharge instructions reviewed with patient verbally and written, patient receptive to instruction.Peripheral IV removed and dry dressing applied. Band aid to left flank clean, dry and intact. Patient's bed rest is over at 1630. Patient is reporting no pain. Wife will be in at 1645 to  patient.

## 2024-10-29 NOTE — SEDATION DOCUMENTATION
Procedure ended, pt tolerated with sedation, band aid to site. Vss, pt denies any pain. Pt to PACU in stable condition. 4 hours bedrest until 1615.

## 2024-11-11 ENCOUNTER — TELEPHONE (OUTPATIENT)
Dept: NEPHROLOGY | Facility: CLINIC | Age: 34
End: 2024-11-11

## 2024-11-18 ENCOUNTER — OFFICE VISIT (OUTPATIENT)
Dept: NEPHROLOGY | Facility: CLINIC | Age: 34
End: 2024-11-18
Payer: COMMERCIAL

## 2024-11-18 VITALS — HEIGHT: 68 IN | WEIGHT: 214 LBS | BODY MASS INDEX: 32.43 KG/M2

## 2024-11-18 DIAGNOSIS — D84.9 IMMUNOSUPPRESSION (HCC): ICD-10-CM

## 2024-11-18 DIAGNOSIS — R80.9 NON-NEPHROTIC RANGE PROTEINURIA: ICD-10-CM

## 2024-11-18 DIAGNOSIS — M32.14 LUPUS NEPHRITIS, ISN/RPS CLASS III (HCC): Primary | ICD-10-CM

## 2024-11-18 DIAGNOSIS — M32.9 SYSTEMIC LUPUS ERYTHEMATOSUS, UNSPECIFIED SLE TYPE, UNSPECIFIED ORGAN INVOLVEMENT STATUS (HCC): ICD-10-CM

## 2024-11-18 DIAGNOSIS — E66.09 CLASS 1 OBESITY DUE TO EXCESS CALORIES WITHOUT SERIOUS COMORBIDITY WITH BODY MASS INDEX (BMI) OF 34.0 TO 34.9 IN ADULT: ICD-10-CM

## 2024-11-18 DIAGNOSIS — R31.21 ASYMPTOMATIC MICROSCOPIC HEMATURIA: ICD-10-CM

## 2024-11-18 DIAGNOSIS — E66.811 CLASS 1 OBESITY DUE TO EXCESS CALORIES WITHOUT SERIOUS COMORBIDITY WITH BODY MASS INDEX (BMI) OF 34.0 TO 34.9 IN ADULT: ICD-10-CM

## 2024-11-18 PROCEDURE — 99214 OFFICE O/P EST MOD 30 MIN: CPT | Performed by: STUDENT IN AN ORGANIZED HEALTH CARE EDUCATION/TRAINING PROGRAM

## 2024-11-18 RX ORDER — PREDNISONE 2.5 MG/1
TABLET ORAL
Qty: 210 TABLET | Refills: 0 | Status: SHIPPED | OUTPATIENT
Start: 2024-12-05 | End: 2025-01-30

## 2024-11-18 RX ORDER — LISINOPRIL 5 MG/1
5 TABLET ORAL DAILY
Qty: 90 TABLET | Refills: 1 | Status: SHIPPED | OUTPATIENT
Start: 2024-11-18

## 2024-11-18 RX ORDER — ERGOCALCIFEROL 1.25 MG/1
1 CAPSULE ORAL WEEKLY
Qty: 12 CAPSULE | Refills: 0 | Status: SHIPPED | OUTPATIENT
Start: 2024-11-18

## 2024-11-18 RX ORDER — PREDNISONE 5 MG/1
15 TABLET ORAL DAILY
Qty: 42 TABLET | Refills: 0 | Status: SHIPPED | OUTPATIENT
Start: 2024-11-20 | End: 2024-12-04

## 2024-11-18 NOTE — PATIENT INSTRUCTIONS
Thank you for coming to your visit today. As we discussed you kidney function is at your baseline. Your electrolytes are normal. Please follow the recommendations below       Recommend low sodium (salt) food    Avoid nonsteroidal anti-inflammatory drugs such as Naprosyn, ibuprofen, Aleve, Advil, Celebrex, Meloxicam (Mobic) etc.  You can use Tylenol as needed if you do not have any liver condition to be concerned about    Try to avoid medications such as pantoprazole or  Protonix/Nexium or Esomeprazole)/Prilosec or omeprazole/Prevacid or lansoprazole/AcipHex or Rabeprazole.  If you are able to, use Pepcid as this is safer for your kidneys.    Try to exercise at least 30 minutes 3 days a week to begin with with an ultimate goal of 5 days a week for at least 30 minutes    Next Visit in 2 months with results   If you need to see us earlier we can change the appointment for you        Joselyn Reyes Bahamonde, MD  Nephrology Attending

## 2024-11-19 PROBLEM — D84.9 IMMUNOSUPPRESSION (HCC): Status: ACTIVE | Noted: 2024-11-19

## 2024-11-19 PROBLEM — M32.14: Status: ACTIVE | Noted: 2024-11-19

## 2024-11-19 NOTE — ASSESSMENT & PLAN NOTE
No joint pain, no rash  Continue Plaquenil  Extrarenal manifestations of lupus as per rheumatology

## 2024-11-19 NOTE — ASSESSMENT & PLAN NOTE
Etiology: Class III lupus nephritis  Time of diagnosis: 10/29/2024  Biopsy date/brief summary: Focal extra capillary proliferative glomerulonephritis, consistent with focal class III lupus nephritis with mild activity and minimal chronicity  Serologies:  Complement normal C3, low C4   Positive NOEMY, positive double-stranded DNA 20  Genetic Testing:not done  Baseline creatinine: 1.01 mg/dL, normal kidney function  Current creatinine: At baseline  Avoid NSAIDs    #Prophylaxis  Gastric Protection: On omeprazole  Continue calcium/vit D  Can discontinue Bactrim now that prednisone is 20 mg         Orders:    Basic metabolic panel; Future    Albumin / creatinine urine ratio; Future    Urinalysis with microscopic; Future    CBC and Platelet; Future    predniSONE 5 mg tablet; Take 3 tablets (15 mg total) by mouth daily for 14 days Do not start before November 20, 2024.    predniSONE 2.5 mg tablet; Take 5 tablets (12.5 mg total) by mouth daily for 14 days, THEN 4 tablets (10 mg total) daily for 14 days, THEN 3 tablets (7.5 mg total) daily for 28 days. Do not start before December 5, 2024.    Vitamin D, Ergocalciferol, 35476 units CAPS; Take 1 capsule by mouth once a week    lisinopril (ZESTRIL) 5 mg tablet; Take 1 tablet (5 mg total) by mouth daily

## 2024-11-19 NOTE — ASSESSMENT & PLAN NOTE
UACR 2243 milligrams per gram  Secondary to class III lupus nephritis  Continuing suppression as above start lisinopril 5

## 2024-11-19 NOTE — ASSESSMENT & PLAN NOTE
#Immunosuppressive Medications  First IS treatment   Prednisone and MMF  Past cycles: None  Current IS meds   Plan:  Ongoing prednisone taper  Recommend increase CellCept to 1.5 g twice daily  Not yet on remission

## 2024-11-19 NOTE — PROGRESS NOTES
Glomerular disease visit   name: Willy Penaloza      : 1990      MRN: [id number]  Encounter Provider: Joselyn Reyes Bahamonde, MD  Encounter Date: 2024   Encounter department: [facility] [place]  :  Assessment & Plan  Lupus nephritis, ISN/RPS class III (HCC)    Etiology: Class III lupus nephritis  Time of diagnosis: 10/29/2024  Biopsy date/brief summary: Focal extra capillary proliferative glomerulonephritis, consistent with focal class III lupus nephritis with mild activity and minimal chronicity  Serologies:  Complement normal C3, low C4   Positive NOEMY, positive double-stranded DNA 20  Genetic Testing:not done  Baseline creatinine: 1.01 mg/dL, normal kidney function  Current creatinine: At baseline  Avoid NSAIDs    #Prophylaxis  Gastric Protection: On omeprazole  Continue calcium/vit D  Can discontinue Bactrim now that prednisone is 20 mg         Orders:    Basic metabolic panel; Future    Albumin / creatinine urine ratio; Future    Urinalysis with microscopic; Future    CBC and Platelet; Future    predniSONE 5 mg tablet; Take 3 tablets (15 mg total) by mouth daily for 14 days Do not start before 2024.    predniSONE 2.5 mg tablet; Take 5 tablets (12.5 mg total) by mouth daily for 14 days, THEN 4 tablets (10 mg total) daily for 14 days, THEN 3 tablets (7.5 mg total) daily for 28 days. Do not start before 2024.    Vitamin D, Ergocalciferol, 61541 units CAPS; Take 1 capsule by mouth once a week    lisinopril (ZESTRIL) 5 mg tablet; Take 1 tablet (5 mg total) by mouth daily    Systemic lupus erythematosus, unspecified SLE type, unspecified organ involvement status (HCC)  No joint pain, no rash  Continue Plaquenil  Extrarenal manifestations of lupus as per rheumatology       Non-nephrotic range proteinuria  UACR 2243 milligrams per gram  Secondary to class III lupus nephritis  Continuing suppression as above start lisinopril 5         Asymptomatic  microscopic hematuria  Microhematuria secondary to class III lupus nephritis  Will monitor       Class 1 obesity due to excess calories without serious comorbidity with body mass index (BMI) of 34.0 to 34.9 in adult    BMI 32.54  Recommend weight loss , heathy diet  Lifestyle modification           Immunosuppression (HCC)    #Immunosuppressive Medications  First IS treatment   Prednisone and MMF  Past cycles: None  Current IS meds   Plan:  Ongoing prednisone taper  Recommend increase CellCept to 1.5 g twice daily  Not yet on remission           History of Present Illness     HPI  Willy Penaloza is a 34 y.o. male  PMH of  SLE diagnosed in 2023 (in the settings of NOEMY>1:1280, dsDNA of 84, anti-RNP/Rothman antibody >8, antihistone antibody, low complement, anticardiolipin antibodies, arthritis, lymphadenopathy, Raynaud's syndrome) follow-up with Dr. Jacobson (rheumatology), who presents for ufollow-up after kidney biops   History obtained from: patient    Review of Systems   Constitutional:  Negative for activity change and appetite change.   HENT:  Negative for congestion and dental problem.    Eyes:  Negative for discharge.   Respiratory:  Negative for cough and choking.    Cardiovascular:  Negative for chest pain and leg swelling.   Gastrointestinal:  Negative for abdominal distention and abdominal pain.   Endocrine: Negative for cold intolerance.   Genitourinary:  Negative for dysuria.   Musculoskeletal:  Negative for arthralgias.   Skin:  Negative for color change and pallor.   Neurological:  Negative for dizziness.   Psychiatric/Behavioral:  Negative for agitation.      Pertinent Medical History   Patient is here for follow-up after kidney biopsy, diagnosis of lupus nephritis class III    Medical History Reviewed by provider this encounter:     .  Current Outpatient Medications on File Prior to Visit   Medication Sig Dispense Refill    calcium carbonate-vitamin D 500 mg-5 mcg per tablet Take 1 tablet by mouth 2  "(two) times a day with meals 180 tablet 1    hydroxychloroquine (PLAQUENIL) 200 mg tablet Take 1 tablet (200 mg total) by mouth 2 (two) times a day with meals 180 tablet 1    mycophenolate (CELLCEPT) 500 mg tablet Take 1 tab twice daily x 1 week, then increase to 2 tabs twice daily 360 tablet 1    omeprazole (PriLOSEC) 20 mg delayed release capsule Take 1 capsule (20 mg total) by mouth daily 90 capsule 0    predniSONE 10 mg tablet Take 6 tablets (60 mg total) by mouth daily 180 tablet 2     No current facility-administered medications on file prior to visit.      Social History     Tobacco Use    Smoking status: Never     Passive exposure: Never    Smokeless tobacco: Never   Vaping Use    Vaping status: Never Used   Substance and Sexual Activity    Alcohol use: Yes     Comment: social use only    Drug use: Never    Sexual activity: Yes        Objective   Ht 5' 8\" (1.727 m)   Wt 97.1 kg (214 lb)   BMI 32.54 kg/m²      Physical Exam  General:  no acute distress at this time  Skin:  No acute rash  Eyes:  No scleral icterus and noninjected  ENT:  mucous membranes moist  Neck:  no carotid bruits  Chest:  Clear to auscultation percussion, good respiratory effort, no use of accessory respiratory muscles  CVS:  Regular rate and rhythm without rub   Abdomen:  soft and nontender   Extremities: no significant lower extremity edema  Neuro:  No gross focality  Psych:  Alert , cooperative       "

## 2024-12-10 ENCOUNTER — TELEMEDICINE (OUTPATIENT)
Dept: RHEUMATOLOGY | Facility: CLINIC | Age: 34
End: 2024-12-10
Payer: COMMERCIAL

## 2024-12-10 ENCOUNTER — TELEPHONE (OUTPATIENT)
Dept: RHEUMATOLOGY | Facility: CLINIC | Age: 34
End: 2024-12-10

## 2024-12-10 DIAGNOSIS — E55.9 VITAMIN D DEFICIENCY: ICD-10-CM

## 2024-12-10 DIAGNOSIS — M32.9 SYSTEMIC LUPUS ERYTHEMATOSUS, UNSPECIFIED SLE TYPE, UNSPECIFIED ORGAN INVOLVEMENT STATUS (HCC): Primary | ICD-10-CM

## 2024-12-10 DIAGNOSIS — Z79.52 CURRENT CHRONIC USE OF SYSTEMIC STEROIDS: ICD-10-CM

## 2024-12-10 DIAGNOSIS — M32.14 LUPUS NEPHRITIS (HCC): ICD-10-CM

## 2024-12-10 DIAGNOSIS — Z79.899 LONG-TERM USE OF PLAQUENIL: ICD-10-CM

## 2024-12-10 DIAGNOSIS — Z79.624 ON MYCOPHENOLATE MOFETIL THERAPY: ICD-10-CM

## 2024-12-10 PROCEDURE — 99215 OFFICE O/P EST HI 40 MIN: CPT | Performed by: INTERNAL MEDICINE

## 2024-12-10 NOTE — ASSESSMENT & PLAN NOTE
Mr. Penaloza is a 34-year-old male originally from [facility] with history significant for systemic lupus erythematosus diagnosed in January 2023 [based on a positive NOEMY>1:1280 speckled pattern, elevated double-stranded DNA antibody of 84, anti-RNP/Rothman antibody greater than 8, elevated antihistone antibody, hypocomplementemia, elevated anticardiolipin antibodies, constitutional symptoms, inflammatory arthritis, lymphadenopathy and Raynaud's] and class III lupus nephritis diagnosed in 10/24 who presents for a follow-up.  He is currently on prednisone 10 mg once daily [taper directed by nephrology], hydroxychloroquine 200 mg twice daily, mycophenolate 1.5 g twice daily, lisinopril 5 mg once daily, vitamin D 50,000 international units once weekly for a total of 12 weeks, omeprazole 20 mg once daily and calcium supplements twice daily.     # Systemic lupus erythematosus  # Biopsy-proven class III lupus nephritis diagnosed in 10/24  - Willy presents today for a follow-up of systemic lupus erythematosus and recent diagnosis of biopsy-proven class III lupus nephritis for which he is currently on prednisone 10 mg once daily [taper directed by nephrology], hydroxychloroquine 200 mg twice daily, mycophenolate 1.5 g twice daily and lisinopril 5 mg once daily.  He has overall been doing well without any concerning symptoms today.  Treatment will be continued the same and he will update high risk medication lab monitoring to assess for drug toxicities and lupus monitoring labs in January.  I encouraged him to schedule the baseline eye exam.    - Steroid side effects include but are not limited to risk for infections, hypertension, diabetes, cataracts/glaucoma, gastritis, avascular necrosis and osteoporosis.  The Bactrim was discontinued once he reached prednisone doses lower than 20 mg once daily.  If he tolerates the oral prednisone well he does not need to continue the omeprazole any longer.  He will maintain the  calcium/vitamin D supplements.    Orders:    CBC and differential; Future    Comprehensive metabolic panel; Future    C-reactive protein; Future    Sedimentation rate, automated; Future    C3 complement; Future    C4 complement; Future    Urinalysis with microscopic; Future    Protein / creatinine ratio, urine; Future    Anti-DNA antibody, double-stranded; Future

## 2024-12-10 NOTE — PROGRESS NOTES
Virtual Regular Visit  Name: Willy Penaloza      : 1990      MRN: [id number]  Encounter Provider: Patrica Jacobson MD  Encounter Date: 12/10/2024   Encounter department: [facility] [place]      Verification of patient location:  Patient is located at {[facility] Virtual Patient Location:[address]82} in the following state in which I hold an active license {[facility] virtual patient location:[address]} :Assessment & Plan  Systemic lupus erythematosus, unspecified SLE type, unspecified organ involvement status (HCC)    Orders:  •  CBC and differential; Future  •  Comprehensive metabolic panel; Future  •  C-reactive protein; Future  •  Sedimentation rate, automated; Future  •  C3 complement; Future  •  C4 complement; Future  •  Urinalysis with microscopic; Future  •  Protein / creatinine ratio, urine; Future  •  Anti-DNA antibody, double-stranded; Future    Lupus nephritis (HCC)         Current chronic use of systemic steroids         Long-term use of Plaquenil         On mycophenolate mofetil therapy         Vitamin D deficiency               Encounter provider Patrica Jacobson MD    The patient was identified by name and date of birth. Willy Penaloza was informed that this is a telemedicine visit and that the visit is being conducted through {[facility] VIRTUAL VISIT MEDIUM:90587}.  {Telemedicine confidentiality :29059} {Telemedicine participants:36002}  He acknowledged consent and understanding of privacy and security of the video platform. The patient has agreed to participate and understands they can discontinue the visit at any time.    Patient is aware this is a billable service.     History of Present Illness {?Quick Links Encounters * My Last Note * Last Note in Specialty * Snapshot * Since Last Visit * History :86488}    HPI  Review of Systems    Objective {?Quick Links Trend Vitals * Enter New Vitals * Results Review * Timeline (Adult) * Labs * Imaging * Cardiology * Procedures * Lung Cancer  Screening * Surgical eConsent :89604}  There were no vitals taken for this visit.    Physical Exam    Visit Time  Total Visit Duration: ***

## 2024-12-10 NOTE — PROGRESS NOTES
Virtual Regular Visit  Name: Willy Penaloza      : 1990      MRN: [id number]  Encounter Provider: Patrica Jacobson MD  Encounter Date: 12/10/2024   Encounter department: [facility]      Verification of patient location:  Patient is located at Home in the following state in which I hold an active license [place] :  Assessment & Plan  Systemic lupus erythematosus, unspecified SLE type, unspecified organ involvement status (HCC)  Mr. Penaloza is a 34-year-old male originally from [facility], [place] with history significant for systemic lupus erythematosus diagnosed in 2023 [based on a positive NOEMY>1:1280 speckled pattern, elevated double-stranded DNA antibody of 84, anti-RNP/Rothman antibody greater than 8, elevated antihistone antibody, hypocomplementemia, elevated anticardiolipin antibodies, constitutional symptoms, inflammatory arthritis, lymphadenopathy and Raynaud's] and class III lupus nephritis diagnosed in 10/24 who presents for a follow-up.  He is currently on prednisone 10 mg once daily [taper directed by nephrology], hydroxychloroquine 200 mg twice daily, mycophenolate 1.5 g twice daily, lisinopril 5 mg once daily, vitamin D 50,000 international units once weekly for a total of 12 weeks, omeprazole 20 mg once daily and calcium supplements twice daily.     # Systemic lupus erythematosus  # Biopsy-proven class III lupus nephritis diagnosed in 10/24  - Willy presents today for a follow-up of systemic lupus erythematosus and recent diagnosis of biopsy-proven class III lupus nephritis for which he is currently on prednisone 10 mg once daily [taper directed by nephrology], hydroxychloroquine 200 mg twice daily, mycophenolate 1.5 g twice daily and lisinopril 5 mg once daily.  He has overall been doing well without any concerning symptoms today.  Treatment will be continued the same and he will update high risk medication lab monitoring to assess for drug toxicities and lupus  monitoring labs in January.  I encouraged him to schedule the baseline eye exam.    - Steroid side effects include but are not limited to risk for infections, hypertension, diabetes, cataracts/glaucoma, gastritis, avascular necrosis and osteoporosis.  The Bactrim was discontinued once he reached prednisone doses lower than 20 mg once daily.  If he tolerates the oral prednisone well he does not need to continue the omeprazole any longer.  He will maintain the calcium/vitamin D supplements.    Orders:    CBC and differential; Future    Comprehensive metabolic panel; Future    C-reactive protein; Future    Sedimentation rate, automated; Future    C3 complement; Future    C4 complement; Future    Urinalysis with microscopic; Future    Protein / creatinine ratio, urine; Future    Anti-DNA antibody, double-stranded; Future    Lupus nephritis (HCC)         Current chronic use of systemic steroids         Long-term use of Plaquenil         On mycophenolate mofetil therapy         Vitamin D deficiency         Systemic lupus erythematosus, unspecified SLE type, unspecified organ involvement status (HCC)         Lupus nephritis (HCC)         Current chronic use of systemic steroids         Long-term use of Plaquenil         On mycophenolate mofetil therapy         Vitamin D deficiency         Patient's rheumatologic disease(s) threaten long-term function if not appropriately managed.      Encounter provider Patrica Jacobson MD    The patient was identified by name and date of birth. Willy Wootenmoshemonserrat was informed that this is a telemedicine visit and that the visit is being conducted through the Epic Embedded platform. He agrees to proceed..  My office door was closed. No one else was in the room.  He acknowledged consent and understanding of privacy and security of the video platform. The patient has agreed to participate and understands they can discontinue the visit at any time.    Patient is aware this is a billable service.      History of Present Illness     HPI    INITIAL VISIT NOTE:  Mr. Penaloza is a 32-year-old male originally from [facility] [email or web address] with no significant past medical history who presents for an evaluation of a positive NOEMY >1:1280 speckled pattern and elevated double-stranded DNA antibody of 84 that were detected as a result of bilateral hand pain and swelling.  He is referred by Dr. Real for a rheumatology consult.     Patient reports he was in his usual state of health up until the summer 2022 and reports for at least 15 years prior to this he had not had to see a physician.  At that time he developed abrupt onset of bilateral hand pain, swelling and morning stiffness where he was unable to make a fist.  He reports running his hands under hot water in the morning would help.  The prominent symptoms lasted through August and he reports following this the hand complaints spontaneously resolved and is currently 90% improved.  At times he has noticed pain also occurring in his wrists and very uncommonly in his knees but generally he is not experiencing concerning joint pain.  No involvement of his elbows, shoulders, hips, ankles or feet.  He reports gradually since last year he has been experiencing diffuse muscle pain affecting the entirety of his upper and lower extremities.  He reports with prolonged duration of being seated the symptoms worsen.  This has been a fairly constant symptom.  He did try naproxen prescribed by his primary care physician which was ineffective.  He has not taken any over-the-counter pain medications.     He has been experiencing multiple additional complaints since last July also including fatigue which is worse towards the end of the day, night sweats which has been severe on at least 3-4 occasions where he has woken up drenched, swollen lymph nodes which he has appreciated in his neck on both the anterior and posterior aspects [which he thinks becomes more prominent when he has episodes of the  night sweats], mild hair loss which was occurring if he ran his fingers through his hair and has overall improved as well as symptoms consistent with Raynaud's affecting his hands where on cold exposure it will initially turn white and with rewarming change to purple/blue and then red.  He reports from the summer till December 2022 he lost 15 pounds unintentionally but his current weight is stable at 197 pounds.     He denies fevers, dry eyes, dry mouth, inflammatory eye disease, recurrent skin rash [reports over the past few dwyer he has appreciated a pimple type of rash on the inner aspects of his bilateral thighs which resolves spontaneously], psoriasis, photosensitivity, mouth/nose ulcers, pleuritic chest pain, shortness of breath, cough, inflammatory bowel disease [reports a history of treated H. pylori infection 10 years ago], blood clots or a family history of autoimmune disease [although he is unaware of his paternal family history].     In view of these symptoms he was evaluated by his primary care physician in September 2022 and had blood work done which showed the positive NOEMY and double-stranded DNA antibody.  An ESR and CRP were elevated at 78 and 14, respectively.  A CBC, CMP, Sjogren's antibodies, Lyme antibody profile, HIV, rheumatoid factor, anti-CCP antibody, hepatitis C antibody and TSH were unremarkable.        1/27/2023:  Patient presents for a follow-up of systemic lupus erythematosus.  He is currently on hydroxychloroquine 200 mg twice daily that was started at the initial office visit.  I reviewed his blood work done after the visit which shows an elevated ESR of 110 with hypocomplementemia with a C3 and C4 decreased at 77 and 2, respectively.  A double-stranded DNA antibody was elevated at 45.  An anti-RNP and Rothman antibodies were greater than 8.  An antihistone antibody was elevated at 6.9.  An anticardiolipin IgG and IgM antibodies were elevated at 71 and 38, respectively.  Vitamin D  levels were low at 14.2.  LDH was slightly elevated at 269.  A CBC, CMP, C-reactive protein, urine analysis, urine protein creatinine ratio, remainder of the NOEMY specificity, beta-2 glycoprotein antibodies, lupus anticoagulant, CK, ferritin, SPEP and leukemia/lymphoma flow cytometry were unremarkable.     He is not reporting any new complaints and we scheduled a follow-up appointment to review his results.        5/11/2023:  Patient presents for a follow-up of systemic lupus erythematosus.  He is currently on hydroxychloroquine that he takes as 200 mg once daily or every other day.  He completed a prednisone taper from 1/27 till 2/27 and has otherwise not been on steroids.     I reviewed his labs done on 3/25 which showed a slightly elevated ESR of 28.  A double-stranded DNA antibody was elevated at 13.  A C4 was slightly low at 8.  A vitamin D level was low at 15.5.  A CBC, CMP, C-reactive protein, urine analysis, urine protein creatinine ratio, C3 and anticardiolipin antibodies were normal.  He was also seen by hematology/oncology and had a CT soft tissue neck done which showed symmetric bilateral cervical adenopathy.  A CT chest/abdomen/pelvis showed mildly enlarged lymphadenopathy throughout the chest, abdomen and pelvis with the largest nodes in the axillary and supraclavicular regions measuring up to 13 mm in short axis and the largest retroperitoneal/pelvic/inguinal lymph nodes measuring up to 11 mm in short axis.  Mild splenomegaly of 13.3 cm was noted.  After his evaluation with hematology/oncology the lymphadenopathy was thought to be secondary to his underlying diagnosis of lupus and there were no concerns for malignancy.  He does not require a scheduled follow-up appointment with them.     He reports since completing the steroid course and starting hydroxychloroquine he has been doing well.  He still notes some lymph nodes at the back of his neck and will experience joint pains occasionally.  No  significant or consistent complaints at this time.  He is also describing symptoms of left heel pain which occurs in the morning or after being seated for a prolonged duration of time.  This improves after taking the first few steps.     Initially he was started on hydroxychloroquine at 200 mg twice daily which he took for the first few months.  After he started noticing an improvement in his symptoms and due to concerns for retinal toxicity he decreased the hydroxychloroquine to 200 mg once daily or every other day.  At times he will notice a slight flareup in his symptoms but this improves after he takes the hydroxychloroquine.        9/21/2023:  Patient presents for a follow-up of systemic lupus erythematosus.  He is currently not on hydroxychloroquine or steroids and states he discontinued the hydroxychloroquine approximately 2 to 3 months ago.  I reviewed his recent labs which showed a slightly elevated double-stranded DNA antibody of 12.  An ESR was elevated at 40.  A vitamin D level was low at 17.8.  A CBC, CMP, C-reactive protein, C3, C4, urine analysis and urine protein creatinine ratio were unremarkable.     Patient reports he discontinued the hydroxychloroquine a few months ago as he has overall been doing well and not experiencing any concerning symptoms.  He even traveled to [facility] and had sun exposure without any flareups noted.  His only complaint is periodic hives that he will experience which will self resolve.  No fevers, unintentional weight loss, alopecia, other types of skin rash, photosensitivity, mouth/nose ulcers, swollen glands, pleuritic chest pain or worsening joint pain/swelling/stiffness.  The lymphadenopathy has resolved.       9/26/24:  Pt reports no complains. UPC showed protein > 3 g. In settings of concern for Lupus nephritis nephrology referral provided, repeat UA/UPC, lupus activity blood work ordered along with hepatitis and TB. Pt started on steroids, MMF, HCQ, Bactrim and  Prilosec. Follow up in 1 month.       12/10/2024:  Patient presents for a follow-up of systemic lupus erythematosus.  He is currently on prednisone 10 mg once daily [taper directed by nephrology], hydroxychloroquine 200 mg twice daily, mycophenolate 1.5 g twice daily, lisinopril 5 mg once daily, vitamin D 50,000 international units once weekly for a total of 12 weeks, omeprazole 20 mg once daily and calcium supplements twice daily.  In the interim he underwent a renal biopsy which showed class III lupus nephritis with mild activity and minimal chronicity.    He reports overall he has been feeling well without any concerning symptoms.  The foaminess of his urine has improved.  No fevers, unintentional weight loss, alopecia, other types of skin rash, photosensitivity, mouth/nose ulcers, swollen glands, pleuritic chest pain or worsening joint pain/swelling/stiffness.     He is yet to schedule his baseline hydroxychloroquine eye exam.  He has been tolerating all of his medications well without any concerns for side effects.      Review of Systems  Constitutional: Negative for weight change, fevers, chills, night sweats, fatigue.  ENT/Mouth: Negative for hearing changes, ear pain, nasal congestion, sinus pain, hoarseness, sore throat, rhinorrhea, swallowing difficulty.   Eyes: Negative for pain, redness, discharge, vision changes.   Cardiovascular: Negative for chest pain, SOB, palpitations.   Respiratory: Negative for cough, sputum, wheezing, dyspnea.   Gastrointestinal: Negative for nausea, vomiting, diarrhea, constipation, pain, heartburn.  Genitourinary: Negative for dysuria, urinary frequency, hematuria.   Musculoskeletal: As per HPI.  Skin: Negative for skin rash, color changes.   Neuro: Negative for weakness, numbness, tingling, loss of consciousness.   Psych: Negative for anxiety, depression.   Heme/Lymph: Negative for easy bruising, bleeding, lymphadenopathy.      Objective   There were no vitals taken for this  visit.    Physical Exam  General: Well appearing, well nourished, in no distress. Oriented x 3, normal mood and affect.  Skin: Good turgor, no rash, unusual bruising or prominent lesions.  Hair: Normal texture and distribution.  HEENT:  Head: Normocephalic, atraumatic.  Eyes: Conjunctiva clear, sclera non-icteric, EOM intact.  Nose: No external lesions.  Neck: Supple.  Neurologic: Alert and oriented. No focal neurological deficits appreciated.   Psychiatric: Normal mood and affect.       Visit Time  Total Visit Duration: 21 minutes.

## 2024-12-12 ENCOUNTER — TELEPHONE (OUTPATIENT)
Dept: RHEUMATOLOGY | Facility: CLINIC | Age: 34
End: 2024-12-12

## 2024-12-12 NOTE — TELEPHONE ENCOUNTER
The patient was called and a VM was left asking him to return the call to schedule a follow up appointment.

## 2025-01-28 LAB
ALBUMIN/CREAT UR: 392 MG/G CREAT (ref 0–29)
APPEARANCE UR: CLEAR
BACTERIA URNS QL MICRO: ABNORMAL
BILIRUB UR QL STRIP: NEGATIVE
BUN SERPL-MCNC: 14 MG/DL (ref 6–20)
BUN/CREAT SERPL: 15 (ref 9–20)
CALCIUM SERPL-MCNC: 9.8 MG/DL (ref 8.7–10.2)
CASTS URNS QL MICRO: ABNORMAL /LPF
CHLORIDE SERPL-SCNC: 99 MMOL/L (ref 96–106)
CO2 SERPL-SCNC: 23 MMOL/L (ref 20–29)
COLOR UR: YELLOW
CREAT SERPL-MCNC: 0.96 MG/DL (ref 0.76–1.27)
CREAT UR-MCNC: 65.1 MG/DL
EGFR: 106 ML/MIN/1.73
EPI CELLS #/AREA URNS HPF: ABNORMAL /HPF (ref 0–10)
ERYTHROCYTE [DISTWIDTH] IN BLOOD BY AUTOMATED COUNT: 13.6 % (ref 11.6–15.4)
GLUCOSE SERPL-MCNC: 94 MG/DL (ref 70–99)
GLUCOSE UR QL: NEGATIVE
HCT VFR BLD AUTO: 39 % (ref 37.5–51)
HGB BLD-MCNC: 13.2 G/DL (ref 13–17.7)
HGB UR QL STRIP: ABNORMAL
KETONES UR QL STRIP: NEGATIVE
LEUKOCYTE ESTERASE UR QL STRIP: NEGATIVE
MCH RBC QN AUTO: 28 PG (ref 26.6–33)
MCHC RBC AUTO-ENTMCNC: 33.8 G/DL (ref 31.5–35.7)
MCV RBC AUTO: 83 FL (ref 79–97)
MICRO URNS: ABNORMAL
MICROALBUMIN UR-MCNC: 255.4 UG/ML
NITRITE UR QL STRIP: NEGATIVE
PH UR STRIP: 6 [PH] (ref 5–7.5)
PLATELET # BLD AUTO: 286 X10E3/UL (ref 150–450)
POTASSIUM SERPL-SCNC: 3.9 MMOL/L (ref 3.5–5.2)
PROT UR QL STRIP: ABNORMAL
RBC # BLD AUTO: 4.71 X10E6/UL (ref 4.14–5.8)
RBC #/AREA URNS HPF: ABNORMAL /HPF (ref 0–2)
SODIUM SERPL-SCNC: 139 MMOL/L (ref 134–144)
SP GR UR: 1.02 (ref 1–1.03)
UROBILINOGEN UR STRIP-ACNC: 0.2 MG/DL (ref 0.2–1)
WBC # BLD AUTO: 6.6 X10E3/UL (ref 3.4–10.8)
WBC #/AREA URNS HPF: ABNORMAL /HPF (ref 0–5)

## 2025-01-31 ENCOUNTER — TELEPHONE (OUTPATIENT)
Dept: NEPHROLOGY | Facility: CLINIC | Age: 35
End: 2025-01-31

## 2025-01-31 NOTE — TELEPHONE ENCOUNTER
Left msg with patient to cancel and re-schedule appt today 01/31 with Pura in the [place] office.  There was a change in the provider's schedule.

## 2025-02-07 ENCOUNTER — OFFICE VISIT (OUTPATIENT)
Dept: NEPHROLOGY | Facility: CLINIC | Age: 35
End: 2025-02-07
Payer: COMMERCIAL

## 2025-02-07 VITALS
SYSTOLIC BLOOD PRESSURE: 120 MMHG | BODY MASS INDEX: 35.01 KG/M2 | HEART RATE: 100 BPM | OXYGEN SATURATION: 99 % | WEIGHT: 231 LBS | DIASTOLIC BLOOD PRESSURE: 82 MMHG | HEIGHT: 68 IN

## 2025-02-07 DIAGNOSIS — E66.811 CLASS 1 OBESITY DUE TO EXCESS CALORIES WITHOUT SERIOUS COMORBIDITY WITH BODY MASS INDEX (BMI) OF 34.0 TO 34.9 IN ADULT: ICD-10-CM

## 2025-02-07 DIAGNOSIS — R80.9 NON-NEPHROTIC RANGE PROTEINURIA: ICD-10-CM

## 2025-02-07 DIAGNOSIS — M32.14 LUPUS NEPHRITIS, ISN/RPS CLASS III (HCC): Primary | ICD-10-CM

## 2025-02-07 DIAGNOSIS — E66.09 CLASS 1 OBESITY DUE TO EXCESS CALORIES WITHOUT SERIOUS COMORBIDITY WITH BODY MASS INDEX (BMI) OF 34.0 TO 34.9 IN ADULT: ICD-10-CM

## 2025-02-07 DIAGNOSIS — D84.9 IMMUNOSUPPRESSION (HCC): ICD-10-CM

## 2025-02-07 DIAGNOSIS — M32.9 SYSTEMIC LUPUS ERYTHEMATOSUS, UNSPECIFIED SLE TYPE, UNSPECIFIED ORGAN INVOLVEMENT STATUS (HCC): ICD-10-CM

## 2025-02-07 PROCEDURE — 99214 OFFICE O/P EST MOD 30 MIN: CPT | Performed by: STUDENT IN AN ORGANIZED HEALTH CARE EDUCATION/TRAINING PROGRAM

## 2025-02-07 NOTE — PATIENT INSTRUCTIONS
Thank you for coming to your visit today. As we discussed you kidney function is normal. Please follow the recommendations below       Recommend low sodium (salt) food    Avoid nonsteroidal anti-inflammatory drugs such as Naprosyn, ibuprofen, Aleve, Advil, Celebrex, Meloxicam (Mobic) etc.  You can use Tylenol as needed if you do not have any liver condition to be concerned about      Try to exercise at least 30 minutes 3 days a week to begin with with an ultimate goal of 5 days a week for at least 30 minutes    Try to lose 5-10 lb by your next visit    Next Visit in 4 months with results   If you need to see us earlier we can change the appointment for you      Joselyn Reyes Bahamonde, MD  Nephrology Attending

## 2025-02-07 NOTE — ASSESSMENT & PLAN NOTE
#Immunosuppressive Medications  First IS treatment   Prednisone and MMF  Past cycles: None  Current IS meds   Plan:  Off prednisone   Continue CellCept 1.5 g twice daily until patient is on remission, plan to decrease to 1000 twice a day 2 months after remission  Continue Plaquenil

## 2025-02-07 NOTE — PROGRESS NOTES
Name: Willy Penaloza      : 1990      MRN: [id number]  Encounter Provider: Joselyn Reyes Bahamonde, MD  Encounter Date: 2025   Encounter department: [facility] [place]  :  Assessment & Plan  Lupus nephritis, ISN/RPS class III ([facility])  Etiology: Class III lupus nephritis  Time of diagnosis: 10/29/2024  Biopsy date/brief summary:   Focal extra capillary proliferative glomerulonephritis, consistent with focal class III lupus nephritis with mild activity and minimal chronicity  Serologies:  Normal C3, low C4    Positive NOEMY, positive double-stranded DNA 20  Genetic Testing:not done  Baseline creatinine:0.9 mg/dL,  remains normal   UA: Microhematuria, leukocyturia resolved  UACR 392 mg/g, improving  See immunosuppression below  About NSAIDs     #Prophylaxis  Gastric Protection: off Omeprazole   Calcium and vitamin D as needed   Off Bactrim       Systemic lupus erythematosus, unspecified SLE type, unspecified organ involvement status (HCC)  No joint pain, no skin rash   Follow-up with rheumatology   Continue Plaquenil       Non-nephrotic range proteinuria  UACR 2243 >>> improved to 392 mg/g   Continue lisinopril          Class 1 obesity due to excess calories without serious comorbidity with body mass index (BMI) of 34.0 to 34.9 in adult  BMI 35.12  Recommend weight loss , heathy diet  Lifestyle modification           Immunosuppression (HCC)  #Immunosuppressive Medications  First IS treatment   Prednisone and MMF  Past cycles: None  Current IS meds   Plan:  Off prednisone   Continue CellCept 1.5 g twice daily until patient is on remission, plan to decrease to 1000 twice a day 2 months after remission  Continue Plaquenil           History of Present Illness   HPI  Willy Penaloza is a 35 y.o. male PMH of  SLE diagnosed in  (in the settings of NOEMY>1:1280, dsDNA of 84, anti-RNP/Rothman antibody >8, antihistone antibody, low complement, anticardiolipin antibodies, arthritis,  lymphadenopathy, Raynaud's syndrome) follow-up with Dr. Jacobson (rheumatology), who presents for ufollow-up after kidney biops   History obtained from: patient  History obtained from: patient    Review of Systems   Constitutional:  Negative for activity change and appetite change.   HENT:  Negative for congestion and dental problem.    Eyes:  Negative for discharge.   Respiratory:  Negative for apnea.    Cardiovascular:  Negative for leg swelling.   Gastrointestinal:  Negative for abdominal distention and abdominal pain.   Endocrine: Negative for cold intolerance.   Genitourinary:  Negative for dysuria.   Musculoskeletal:  Negative for arthralgias and back pain.   Skin:  Negative for color change and pallor.   Neurological:  Negative for dizziness.   Psychiatric/Behavioral:  Negative for agitation.      Pertinent Medical History     Patient is here for follow-up after kidney biopsy, diagnosis of lupus nephritis class III    Medical History Reviewed by provider this encounter:     .  Current Outpatient Medications on File Prior to Visit   Medication Sig Dispense Refill    calcium carbonate-vitamin D 500 mg-5 mcg per tablet Take 1 tablet by mouth 2 (two) times a day with meals 180 tablet 1    hydroxychloroquine (PLAQUENIL) 200 mg tablet Take 1 tablet (200 mg total) by mouth 2 (two) times a day with meals 180 tablet 1    mycophenolate (CELLCEPT) 500 mg tablet Take 1 tab twice daily x 1 week, then increase to 2 tabs twice daily 360 tablet 1    omeprazole (PriLOSEC) 20 mg delayed release capsule Take 1 capsule (20 mg total) by mouth daily 90 capsule 0    predniSONE 10 mg tablet Take 6 tablets (60 mg total) by mouth daily 180 tablet 2     No current facility-administered medications on file prior to visit.      Social History     Tobacco Use    Smoking status: Never     Passive exposure: Never    Smokeless tobacco: Never   Vaping Use    Vaping status: Never Used   Substance and Sexual Activity    Alcohol use: Yes      Comment: social use only    Drug use: Never    Sexual activity: Yes            Medical History Reviewed by provider this encounter:     .  Current Outpatient Medications on File Prior to Visit   Medication Sig Dispense Refill    calcium carbonate-vitamin D 500 mg-5 mcg per tablet Take 1 tablet by mouth 2 (two) times a day with meals 180 tablet 1    hydroxychloroquine (PLAQUENIL) 200 mg tablet Take 1 tablet (200 mg total) by mouth 2 (two) times a day with meals 180 tablet 1    lisinopril (ZESTRIL) 5 mg tablet Take 1 tablet (5 mg total) by mouth daily 90 tablet 1    mycophenolate (CELLCEPT) 500 mg tablet Take 1 tab twice daily x 1 week, then increase to 2 tabs twice daily 360 tablet 1    Vitamin D, Ergocalciferol, 47599 units CAPS Take 1 capsule by mouth once a week 12 capsule 0     No current facility-administered medications on file prior to visit.      Social History     Tobacco Use    Smoking status: Never     Passive exposure: Never    Smokeless tobacco: Never   Vaping Use    Vaping status: Never Used   Substance and Sexual Activity    Alcohol use: Yes     Comment: social use only    Drug use: Never    Sexual activity: Yes        Objective   There were no vitals taken for this visit.     Physical Exam  General:  no acute distress at this time  Skin:  No acute rash  Eyes:  No scleral icterus and noninjected  ENT:  mucous membranes moist  Neck:  no carotid bruits  Chest:  Clear to auscultation percussion, good respiratory effort, no use of accessory respiratory muscles  CVS:  Regular rate and rhythm without rub   Abdomen:  soft and nontender   Extremities: no significant lower extremity edema  Neuro:  No gross focality  Psych:  Alert , cooperative

## 2025-02-07 NOTE — ASSESSMENT & PLAN NOTE
Etiology: Class III lupus nephritis  Time of diagnosis: 10/29/2024  Biopsy date/brief summary:   Focal extra capillary proliferative glomerulonephritis, consistent with focal class III lupus nephritis with mild activity and minimal chronicity  Serologies:  Normal C3, low C4    Positive NOEMY, positive double-stranded DNA 20  Genetic Testing:not done  Baseline creatinine:0.9 mg/dL,  remains normal   UA: Microhematuria, leukocyturia resolved  UACR 392 mg/g, improving  See immunosuppression below  About NSAIDs     #Prophylaxis  Gastric Protection: off Omeprazole   Calcium and vitamin D as needed   Off Bactrim

## 2025-02-09 DIAGNOSIS — M32.14 LUPUS NEPHRITIS, ISN/RPS CLASS III (HCC): ICD-10-CM

## 2025-02-11 RX ORDER — ERGOCALCIFEROL 1.25 MG/1
50000 CAPSULE, LIQUID FILLED ORAL WEEKLY
Qty: 12 CAPSULE | Refills: 0 | OUTPATIENT
Start: 2025-02-11

## 2025-02-11 NOTE — TELEPHONE ENCOUNTER
LM to patient letting him know that his refill of vitamin D2 was refused because completed. Advised to please call our office to let us know he received the message and if have any other questions or concerns.

## 2025-02-19 DIAGNOSIS — R80.9 NON-NEPHROTIC RANGE PROTEINURIA: ICD-10-CM

## 2025-02-20 RX ORDER — LISINOPRIL 5 MG/1
5 TABLET ORAL DAILY
Qty: 90 TABLET | Refills: 1 | Status: SHIPPED | OUTPATIENT
Start: 2025-02-20

## 2025-03-25 DIAGNOSIS — M32.9 SYSTEMIC LUPUS ERYTHEMATOSUS, UNSPECIFIED SLE TYPE, UNSPECIFIED ORGAN INVOLVEMENT STATUS (HCC): ICD-10-CM

## 2025-03-25 DIAGNOSIS — M32.14 LUPUS NEPHRITIS (HCC): ICD-10-CM

## 2025-03-25 RX ORDER — HYDROXYCHLOROQUINE SULFATE 200 MG/1
TABLET, FILM COATED ORAL
Qty: 180 TABLET | Refills: 0 | Status: SHIPPED | OUTPATIENT
Start: 2025-03-25 | End: 2025-06-23

## 2025-03-25 RX ORDER — MYCOPHENOLATE MOFETIL 500 MG/1
1500 TABLET ORAL EVERY 12 HOURS SCHEDULED
Qty: 540 TABLET | Refills: 0 | Status: SHIPPED | OUTPATIENT
Start: 2025-03-25

## 2025-03-25 NOTE — TELEPHONE ENCOUNTER
Meds refilled but he is overdue for appt, please schedule asap, if he prefers VV that can also be done.

## 2025-04-09 ENCOUNTER — TELEPHONE (OUTPATIENT)
Dept: NEPHROLOGY | Facility: CLINIC | Age: 35
End: 2025-04-09

## 2025-04-09 NOTE — TELEPHONE ENCOUNTER
Lm for pt to inform of schedule change on 5/30 - Jane will not be in the office, so moved 11am appt to 12pm with Dr. Reyes same day - ask that he call back to confirm or reschedule.

## 2025-05-19 ENCOUNTER — TELEPHONE (OUTPATIENT)
Dept: RHEUMATOLOGY | Facility: CLINIC | Age: 35
End: 2025-05-19

## 2025-05-21 ENCOUNTER — TELEPHONE (OUTPATIENT)
Dept: NEPHROLOGY | Facility: CLINIC | Age: 35
End: 2025-05-21

## 2025-05-21 NOTE — TELEPHONE ENCOUNTER
LVM to patient reminding to please complete labwork prior to 5/30 appointment with Dr. Reyes. Advised patient to call back with any questions or  Concerns.

## 2025-06-17 ENCOUNTER — TELEPHONE (OUTPATIENT)
Dept: RHEUMATOLOGY | Facility: CLINIC | Age: 35
End: 2025-06-17